# Patient Record
Sex: FEMALE | Race: WHITE | URBAN - METROPOLITAN AREA
[De-identification: names, ages, dates, MRNs, and addresses within clinical notes are randomized per-mention and may not be internally consistent; named-entity substitution may affect disease eponyms.]

---

## 2017-09-21 ENCOUNTER — OUTPATIENT (OUTPATIENT)
Dept: OUTPATIENT SERVICES | Facility: HOSPITAL | Age: 72
LOS: 1 days | Discharge: HOME | End: 2017-09-21

## 2017-09-21 DIAGNOSIS — Z12.31 ENCOUNTER FOR SCREENING MAMMOGRAM FOR MALIGNANT NEOPLASM OF BREAST: ICD-10-CM

## 2018-10-01 ENCOUNTER — APPOINTMENT (OUTPATIENT)
Dept: HEMATOLOGY ONCOLOGY | Facility: CLINIC | Age: 73
End: 2018-10-01

## 2018-10-01 ENCOUNTER — LABORATORY RESULT (OUTPATIENT)
Age: 73
End: 2018-10-01

## 2018-10-01 VITALS
WEIGHT: 160 LBS | SYSTOLIC BLOOD PRESSURE: 185 MMHG | HEART RATE: 68 BPM | HEIGHT: 66 IN | RESPIRATION RATE: 16 BRPM | BODY MASS INDEX: 25.71 KG/M2 | TEMPERATURE: 98.8 F | DIASTOLIC BLOOD PRESSURE: 77 MMHG

## 2018-10-01 DIAGNOSIS — Z86.79 PERSONAL HISTORY OF OTHER DISEASES OF THE CIRCULATORY SYSTEM: ICD-10-CM

## 2018-10-01 DIAGNOSIS — Z80.0 FAMILY HISTORY OF MALIGNANT NEOPLASM OF DIGESTIVE ORGANS: ICD-10-CM

## 2018-10-01 DIAGNOSIS — Z86.39 PERSONAL HISTORY OF OTHER ENDOCRINE, NUTRITIONAL AND METABOLIC DISEASE: ICD-10-CM

## 2018-10-01 DIAGNOSIS — Z86.69 PERSONAL HISTORY OF OTHER DISEASES OF THE NERVOUS SYSTEM AND SENSE ORGANS: ICD-10-CM

## 2018-10-01 RX ORDER — ATENOLOL 50 MG/1
50 TABLET ORAL
Refills: 0 | Status: ACTIVE | COMMUNITY

## 2018-10-01 RX ORDER — DOCUSATE SODIUM 100 MG/1
100 CAPSULE, LIQUID FILLED ORAL
Refills: 0 | Status: ACTIVE | COMMUNITY

## 2018-10-01 RX ORDER — CHROMIUM 200 MCG
1000 TABLET ORAL
Refills: 0 | Status: ACTIVE | COMMUNITY

## 2018-10-01 RX ORDER — PHENOBARBITAL 32.4 MG/1
32.4 TABLET ORAL
Refills: 0 | Status: ACTIVE | COMMUNITY

## 2018-10-05 ENCOUNTER — APPOINTMENT (OUTPATIENT)
Dept: INFUSION THERAPY | Facility: CLINIC | Age: 73
End: 2018-10-05

## 2018-10-05 RX ORDER — IRON SUCROSE 20 MG/ML
200 INJECTION, SOLUTION INTRAVENOUS ONCE
Qty: 0 | Refills: 0 | Status: COMPLETED | OUTPATIENT
Start: 2018-10-05 | End: 2018-10-05

## 2018-10-05 RX ADMIN — IRON SUCROSE 220 MILLIGRAM(S): 20 INJECTION, SOLUTION INTRAVENOUS at 15:29

## 2018-10-12 ENCOUNTER — APPOINTMENT (OUTPATIENT)
Dept: INFUSION THERAPY | Facility: CLINIC | Age: 73
End: 2018-10-12

## 2018-10-12 RX ORDER — IRON SUCROSE 20 MG/ML
200 INJECTION, SOLUTION INTRAVENOUS ONCE
Qty: 0 | Refills: 0 | Status: COMPLETED | OUTPATIENT
Start: 2018-10-12 | End: 2018-10-12

## 2018-10-12 RX ADMIN — IRON SUCROSE 220 MILLIGRAM(S): 20 INJECTION, SOLUTION INTRAVENOUS at 09:28

## 2018-10-19 ENCOUNTER — APPOINTMENT (OUTPATIENT)
Dept: INFUSION THERAPY | Facility: CLINIC | Age: 73
End: 2018-10-19

## 2018-10-19 VITALS
DIASTOLIC BLOOD PRESSURE: 62 MMHG | TEMPERATURE: 98.2 F | HEART RATE: 68 BPM | RESPIRATION RATE: 18 BRPM | SYSTOLIC BLOOD PRESSURE: 143 MMHG

## 2018-10-19 RX ORDER — IRON SUCROSE 20 MG/ML
200 INJECTION, SOLUTION INTRAVENOUS ONCE
Qty: 0 | Refills: 0 | Status: COMPLETED | OUTPATIENT
Start: 2018-10-19 | End: 2018-10-19

## 2018-10-19 RX ADMIN — IRON SUCROSE 220 MILLIGRAM(S): 20 INJECTION, SOLUTION INTRAVENOUS at 09:22

## 2018-10-26 ENCOUNTER — APPOINTMENT (OUTPATIENT)
Dept: INFUSION THERAPY | Facility: CLINIC | Age: 73
End: 2018-10-26

## 2018-10-26 RX ORDER — IRON SUCROSE 20 MG/ML
200 INJECTION, SOLUTION INTRAVENOUS ONCE
Qty: 0 | Refills: 0 | Status: COMPLETED | OUTPATIENT
Start: 2018-10-26 | End: 2018-10-26

## 2018-10-26 RX ADMIN — IRON SUCROSE 220 MILLIGRAM(S): 20 INJECTION, SOLUTION INTRAVENOUS at 09:26

## 2018-11-02 ENCOUNTER — APPOINTMENT (OUTPATIENT)
Dept: INFUSION THERAPY | Facility: CLINIC | Age: 73
End: 2018-11-02

## 2018-11-02 RX ORDER — IRON SUCROSE 20 MG/ML
200 INJECTION, SOLUTION INTRAVENOUS ONCE
Qty: 0 | Refills: 0 | Status: COMPLETED | OUTPATIENT
Start: 2018-11-02 | End: 2018-11-02

## 2018-11-02 RX ADMIN — IRON SUCROSE 220 MILLIGRAM(S): 20 INJECTION, SOLUTION INTRAVENOUS at 09:53

## 2018-11-12 ENCOUNTER — APPOINTMENT (OUTPATIENT)
Dept: HEMATOLOGY ONCOLOGY | Facility: CLINIC | Age: 73
End: 2018-11-12

## 2018-11-12 ENCOUNTER — LABORATORY RESULT (OUTPATIENT)
Age: 73
End: 2018-11-12

## 2018-12-21 ENCOUNTER — APPOINTMENT (OUTPATIENT)
Dept: HEMATOLOGY ONCOLOGY | Facility: CLINIC | Age: 73
End: 2018-12-21

## 2018-12-21 ENCOUNTER — OUTPATIENT (OUTPATIENT)
Dept: OUTPATIENT SERVICES | Facility: HOSPITAL | Age: 73
LOS: 1 days | Discharge: HOME | End: 2018-12-21

## 2018-12-21 ENCOUNTER — OTHER (OUTPATIENT)
Age: 73
End: 2018-12-21

## 2018-12-21 ENCOUNTER — LABORATORY RESULT (OUTPATIENT)
Age: 73
End: 2018-12-21

## 2018-12-21 VITALS
TEMPERATURE: 98.2 F | WEIGHT: 164 LBS | DIASTOLIC BLOOD PRESSURE: 74 MMHG | SYSTOLIC BLOOD PRESSURE: 142 MMHG | HEIGHT: 66 IN | RESPIRATION RATE: 16 BRPM | BODY MASS INDEX: 26.36 KG/M2 | HEART RATE: 62 BPM

## 2018-12-21 DIAGNOSIS — Z12.31 ENCOUNTER FOR SCREENING MAMMOGRAM FOR MALIGNANT NEOPLASM OF BREAST: ICD-10-CM

## 2018-12-21 DIAGNOSIS — D50.9 IRON DEFICIENCY ANEMIA, UNSPECIFIED: ICD-10-CM

## 2018-12-21 LAB
ENDOMYSIUM IGA SER QL: NEGATIVE
ENDOMYSIUM IGA TITR SER: NORMAL
FERRITIN SERPL-MCNC: 452 NG/ML
FERRITIN SERPL-MCNC: 9 NG/ML
FOLATE SERPL-MCNC: 14.9 NG/ML
GLIADIN IGA SER QL: 7.4 UNITS
GLIADIN IGG SER QL: <5 UNITS
GLIADIN PEPTIDE IGA SER-ACNC: NEGATIVE
GLIADIN PEPTIDE IGG SER-ACNC: NEGATIVE
HCT VFR BLD CALC: 37.5 %
HCT VFR BLD CALC: 39.3 %
HCT VFR BLD CALC: 41.6 %
HGB BLD-MCNC: 11.5 G/DL
HGB BLD-MCNC: 12.3 G/DL
HGB BLD-MCNC: 13.7 G/DL
IRON SATN MFR SERPL: 10 %
IRON SATN MFR SERPL: 51 %
IRON SERPL-MCNC: 132 UG/DL
IRON SERPL-MCNC: 38 UG/DL
MCHC RBC-ENTMCNC: 26.3 PG
MCHC RBC-ENTMCNC: 28.4 PG
MCHC RBC-ENTMCNC: 30.1 PG
MCHC RBC-ENTMCNC: 30.7 G/DL
MCHC RBC-ENTMCNC: 31.3 G/DL
MCHC RBC-ENTMCNC: 32.9 G/DL
MCV RBC AUTO: 85.6 FL
MCV RBC AUTO: 90.8 FL
MCV RBC AUTO: 91.4 FL
METHYLMALONATE SERPL-SCNC: 224 NMOL/L
PLATELET # BLD AUTO: 146 K/UL
PLATELET # BLD AUTO: 161 K/UL
PLATELET # BLD AUTO: 195 K/UL
PMV BLD: 10.4 FL
PMV BLD: 10.9 FL
PMV BLD: 11 FL
RBC # BLD: 4.33 M/UL
RBC # BLD: 4.38 M/UL
RBC # BLD: 4.55 M/UL
RBC # FLD: 14.1 %
RBC # FLD: 15.9 %
RBC # FLD: 18.1 %
RETICS # AUTO: 1 %
RETICS AGGREG/RBC NFR: 40.3 K/UL
TIBC SERPL-MCNC: 261 UG/DL
TIBC SERPL-MCNC: 385 UG/DL
TTG IGA SER IA-ACNC: 8.1 UNITS
TTG IGA SER-ACNC: NEGATIVE
TTG IGG SER IA-ACNC: <5 UNITS
TTG IGG SER IA-ACNC: NEGATIVE
UIBC SERPL-MCNC: 129 UG/DL
UIBC SERPL-MCNC: 347 UG/DL
VIT B12 SERPL-MCNC: 460 PG/ML
WBC # FLD AUTO: 3.44 K/UL
WBC # FLD AUTO: 4.24 K/UL
WBC # FLD AUTO: 5.3 K/UL

## 2018-12-24 DIAGNOSIS — Z02.9 ENCOUNTER FOR ADMINISTRATIVE EXAMINATIONS, UNSPECIFIED: ICD-10-CM

## 2018-12-24 DIAGNOSIS — Z12.31 ENCOUNTER FOR SCREENING MAMMOGRAM FOR MALIGNANT NEOPLASM OF BREAST: ICD-10-CM

## 2019-02-05 ENCOUNTER — APPOINTMENT (OUTPATIENT)
Dept: HEMATOLOGY ONCOLOGY | Facility: CLINIC | Age: 74
End: 2019-02-05

## 2019-02-05 ENCOUNTER — LABORATORY RESULT (OUTPATIENT)
Age: 74
End: 2019-02-05

## 2019-03-22 ENCOUNTER — APPOINTMENT (OUTPATIENT)
Dept: HEMATOLOGY ONCOLOGY | Facility: CLINIC | Age: 74
End: 2019-03-22

## 2019-03-22 ENCOUNTER — OUTPATIENT (OUTPATIENT)
Dept: OUTPATIENT SERVICES | Facility: HOSPITAL | Age: 74
LOS: 1 days | Discharge: HOME | End: 2019-03-22

## 2019-03-22 ENCOUNTER — LABORATORY RESULT (OUTPATIENT)
Age: 74
End: 2019-03-22

## 2019-03-22 VITALS
TEMPERATURE: 98.2 F | DIASTOLIC BLOOD PRESSURE: 63 MMHG | SYSTOLIC BLOOD PRESSURE: 181 MMHG | BODY MASS INDEX: 26.52 KG/M2 | WEIGHT: 165 LBS | HEART RATE: 67 BPM | HEIGHT: 66 IN | RESPIRATION RATE: 16 BRPM

## 2019-03-22 DIAGNOSIS — D50.9 IRON DEFICIENCY ANEMIA, UNSPECIFIED: ICD-10-CM

## 2019-03-22 LAB
FERRITIN SERPL-MCNC: 150 NG/ML
HCT VFR BLD CALC: 40.9 %
HCT VFR BLD CALC: 41.5 %
HGB BLD-MCNC: 13.5 G/DL
HGB BLD-MCNC: 13.8 G/DL
IRON SATN MFR SERPL: 42 %
IRON SERPL-MCNC: 118 UG/DL
MCHC RBC-ENTMCNC: 31.1 PG
MCHC RBC-ENTMCNC: 32.3 PG
MCHC RBC-ENTMCNC: 32.5 G/DL
MCHC RBC-ENTMCNC: 33.7 G/DL
MCV RBC AUTO: 95.6 FL
MCV RBC AUTO: 95.8 FL
PLATELET # BLD AUTO: 138 K/UL
PLATELET # BLD AUTO: 139 K/UL
PMV BLD: 10.9 FL
PMV BLD: 11.1 FL
RBC # BLD: 4.27 M/UL
RBC # BLD: 4.34 M/UL
RBC # FLD: 12.1 %
RBC # FLD: 12.7 %
TIBC SERPL-MCNC: 281 UG/DL
UIBC SERPL-MCNC: 163 UG/DL
WBC # FLD AUTO: 3.81 K/UL
WBC # FLD AUTO: 3.84 K/UL

## 2019-03-25 LAB
FERRITIN SERPL-MCNC: 150 NG/ML
IRON SATN MFR SERPL: 39 %
IRON SERPL-MCNC: 99 UG/DL
TIBC SERPL-MCNC: 255 UG/DL
UIBC SERPL-MCNC: 156 UG/DL

## 2019-03-27 NOTE — REASON FOR VISIT
[Follow-Up Visit] : a follow-up visit for [Spouse] : spouse [FreeTextEntry2] : Iron deficiency Anemia

## 2019-03-27 NOTE — REVIEW OF SYSTEMS
[Constipation] : constipation [Anxiety] : anxiety [Negative] : Allergic/Immunologic [Suicidal] : not suicidal [Insomnia] : no insomnia [Depression] : no depression [FreeTextEntry7] : chronic constipation- taking stool softener with laxative

## 2019-03-27 NOTE — ASSESSMENT
[FreeTextEntry1] : Iron Deficiency Anemia\par \par PLAN:\par --S/P Venofer 200 mg iv x 5 from Oct - Nov 2018. Repeat ferritin - 150 and percent sat 42% (2/2019)\par --Celiac disease serology negative. B12 and FA wnl. She was on rabeprazole for several years which can result in atrophic gastritis. Also admits to have had poor diet deficient in meat or green leafy veg for 2 years \par -following her mother's death, nutritional component may also have been present. Diet now is well balanced per pt and spouse.\par -- S/P EGD and colonoscopy, normal per pt. Requested to bring in the report. \par -- Will order blood work ordered for CBC, iron studies, TIBC, % sat, Ferritin\par -- RTO for followup in 3 months.\par

## 2019-03-27 NOTE — HISTORY OF PRESENT ILLNESS
[de-identified] : 73 year old white female here referred by Dr. Pérez for iron deficiency anemia.  She had blood work on 926/18. CBC showed Hb 10.8, MCV 84.4, WBC 4.6, . Serum iron 28, % sat 7%, Ferritin 8, B12 335. Her renal function is normal. She admits to change in appetite since death of mother 2 years ago. She does not eat meat. She denies any sign of bleeding, n/v/d. She feels little anxious and dizziness. \par She had colonoscopy on 9/4/18 by Dr. Salazar. She was told that there was no abnormal finding. \par Endoscopy is scheduled to be done on 10/4/18.\par She is taking Aciphex x 15 years. She is concerning if this would cause iron deficiency.\par \par She has h/o seizure and has been taking phenobarbital. She had hysterectomy at 41 yo for menorrhagia.\par \par She does screening mammogram annually.\par  [de-identified] : 3/22/19: \par Pt returns for a follow-up visit. Due to iron deficiency, she received 5 doses of IV venofer 200 mg x 5 doses in Oct-Nov 2018. Her ferritin was 150 in Feb 2019 and percent sat 42%. Today's Hb is 13.8. No fresh complaints. No bleeding. S/P EGD. She was told by Dr Salazar that it was wnl. Capsule endoscopy was not done, given her stable Hb and iron stores.

## 2019-06-03 ENCOUNTER — LABORATORY RESULT (OUTPATIENT)
Age: 74
End: 2019-06-03

## 2019-06-03 ENCOUNTER — APPOINTMENT (OUTPATIENT)
Dept: HEMATOLOGY ONCOLOGY | Facility: CLINIC | Age: 74
End: 2019-06-03
Payer: MEDICARE

## 2019-06-03 PROCEDURE — 99213 OFFICE O/P EST LOW 20 MIN: CPT

## 2019-06-03 NOTE — HISTORY OF PRESENT ILLNESS
[de-identified] : 73 year old white female was referred by Dr. Pérez for iron deficiency anemia.  She had blood work on 926/18. CBC showed Hb 10.8, MCV 84.4, WBC 4.6, . Serum iron 28, % sat 7%, Ferritin 8, B12 335. Her renal function is normal. She admits to change in appetite since death of mother 2 years ago. She does not eat meat. She denies any sign of bleeding, n/v/d. She feels little anxious and dizziness. \par She had colonoscopy on 9/4/18 by Dr. Salazar. She was told that there was no abnormal finding. \par Endoscopy is scheduled to be done on 10/4/18.\par She is taking Aciphex x 15 years. She is concerning if this would cause iron deficiency.\par \par She has h/o seizure and has been taking phenobarbital. She had hysterectomy at 41 yo for menorrhagia.\par \par She does screening mammogram annually.\par  [de-identified] : 12/21/18:\par The patient is here for f/u visit. She received venofer 200 mg weekly x 5

## 2019-06-03 NOTE — REVIEW OF SYSTEMS
[Constipation] : constipation [Negative] : Allergic/Immunologic [FreeTextEntry7] : chronic constipation- taking stool softener with laxative

## 2019-06-03 NOTE — ASSESSMENT
[FreeTextEntry1] : Iron Deficiency Anemia\par \par PLAN:\par -- Given she can not tolerate oral iron supplement due to constipation, will schedule her for Venofer 200 mg iv x 5.\par -- Followup with GI for endoscopy as scheduled on 10/4/18. Patient may need a capsule endoscopy.\par -- Will order blood work ordered for CBC, iron studies, TIBC, % sat, Ferritin. B12, MMA, Celiac disease panel.\par -- RTO for followup in 2 months.\par

## 2019-06-03 NOTE — REASON FOR VISIT
[Initial Consultation] : an initial consultation for [Spouse] : spouse [FreeTextEntry2] : Iron deficiency anemia

## 2019-06-03 NOTE — CONSULT LETTER
[Dear  ___] : Dear  [unfilled], [Consult Letter:] : I had the pleasure of evaluating your patient, [unfilled]. [Please see my note below.] : Please see my note below. [Consult Closing:] : Thank you very much for allowing me to participate in the care of this patient.  If you have any questions, please do not hesitate to contact me. [Sincerely,] : Sincerely, [FreeTextEntry3] : Martin Hernadnez MD

## 2019-06-05 NOTE — ASSESSMENT
[FreeTextEntry1] : Iron Deficiency Anemia.\par Chronic leukopenia.\par \par PLAN:\par -- Reviewed repeat CBC from today. It shows normal Hb and stable mild leukopenia.\par -- Will order blood work for iron studies, TIBC, % sat, Ferritin.\par -- Continue observation.\par -- RTO for followup in 3 months.\par

## 2019-06-05 NOTE — HISTORY OF PRESENT ILLNESS
[de-identified] : 73 year old white female here referred by Dr. Pérez for iron deficiency anemia.  She had blood work on 926/18. CBC showed Hb 10.8, MCV 84.4, WBC 4.6, . Serum iron 28, % sat 7%, Ferritin 8, B12 335. Her renal function is normal. She admits to change in appetite since death of mother 2 years ago. She does not eat meat. She denies any sign of bleeding, n/v/d. She feels little anxious and dizziness. \par She had colonoscopy on 9/4/18 by Dr. Salazar. She was told that there was no abnormal finding. \par Endoscopy is scheduled to be done on 10/4/18.\par She is taking Aciphex x 15 years. She is concerning if this would cause iron deficiency.\par \par She has h/o seizure and has been taking phenobarbital. She had hysterectomy at 39 yo for menorrhagia.\par \par She does screening mammogram annually.\par  [de-identified] : 3/22/19: \par Pt returns for a follow-up visit. Due to iron deficiency, she received 5 doses of IV venofer 200 mg x 5 doses in Oct-Nov 2018. Her ferritin was 150 in Feb 2019 and percent sat 42%. Today's Hb is 13.8. No fresh complaints. No bleeding. S/P EGD. She was told by Dr Salazar that it was wnl. Capsule endoscopy was not done, given her stable Hb and iron stores. \par \par 6/3/19:\par The patient is here for followup visit. She has a h/o iron deficiency anemia and received  IV venofer 200 mg x 5 doses in Oct-Nov 2018. She responded well. Her repeat CBC showed normal Hb. Celiac disease serology was negative. B12 and FA wnl. She does not have new complains.

## 2019-06-05 NOTE — REVIEW OF SYSTEMS
[Constipation] : constipation [Anxiety] : anxiety [Negative] : Heme/Lymph [Suicidal] : not suicidal [Insomnia] : no insomnia [Depression] : no depression [FreeTextEntry7] : chronic constipation- taking stool softener with laxative

## 2019-09-09 ENCOUNTER — OUTPATIENT (OUTPATIENT)
Dept: OUTPATIENT SERVICES | Facility: HOSPITAL | Age: 74
LOS: 1 days | Discharge: HOME | End: 2019-09-09

## 2019-09-09 ENCOUNTER — APPOINTMENT (OUTPATIENT)
Dept: HEMATOLOGY ONCOLOGY | Facility: CLINIC | Age: 74
End: 2019-09-09
Payer: MEDICARE

## 2019-09-09 ENCOUNTER — LABORATORY RESULT (OUTPATIENT)
Age: 74
End: 2019-09-09

## 2019-09-09 VITALS
BODY MASS INDEX: 26.84 KG/M2 | SYSTOLIC BLOOD PRESSURE: 186 MMHG | RESPIRATION RATE: 16 BRPM | TEMPERATURE: 97.7 F | HEIGHT: 66 IN | WEIGHT: 167 LBS | DIASTOLIC BLOOD PRESSURE: 73 MMHG | HEART RATE: 66 BPM

## 2019-09-09 DIAGNOSIS — D72.819 DECREASED WHITE BLOOD CELL COUNT, UNSPECIFIED: ICD-10-CM

## 2019-09-09 DIAGNOSIS — D50.9 IRON DEFICIENCY ANEMIA, UNSPECIFIED: ICD-10-CM

## 2019-09-09 DIAGNOSIS — Z00.00 ENCOUNTER FOR GENERAL ADULT MEDICAL EXAMINATION W/OUT ABNORMAL FINDINGS: ICD-10-CM

## 2019-09-09 PROCEDURE — 99213 OFFICE O/P EST LOW 20 MIN: CPT

## 2019-09-09 RX ORDER — RABEPRAZOLE SODIUM 20 MG/1
20 TABLET, DELAYED RELEASE ORAL
Refills: 0 | Status: COMPLETED | COMMUNITY
End: 2019-09-09

## 2019-09-17 NOTE — ASSESSMENT
[FreeTextEntry1] : Iron Deficiency Anemia.\par Chronic leukopenia.\par \par PLAN:\par -- Reviewed repeat CBC from today. It shows normal Hb and stable mild leukopenia.\par -- Will order blood work for iron studies, TIBC, % sat, Ferritin.\par -- Continue observation.\par -- RTO for followup in 6 months.\par \par Case was seen and discussed with Dr. Hernandez who agreed with the assessment and plan.\par \par

## 2019-09-17 NOTE — HISTORY OF PRESENT ILLNESS
[de-identified] : 73 year old white female here referred by Dr. Pérez for iron deficiency anemia.  She had blood work on 926/18. CBC showed Hb 10.8, MCV 84.4, WBC 4.6, . Serum iron 28, % sat 7%, Ferritin 8, B12 335. Her renal function is normal. She admits to change in appetite since death of mother 2 years ago. She does not eat meat. She denies any sign of bleeding, n/v/d. She feels little anxious and dizziness. \par She had colonoscopy on 9/4/18 by Dr. Salazar. She was told that there was no abnormal finding. \par Endoscopy is scheduled to be done on 10/4/18.\par She is taking Aciphex x 15 years. She is concerning if this would cause iron deficiency.\par \par She has h/o seizure and has been taking phenobarbital. She had hysterectomy at 41 yo for menorrhagia.\par \par She does screening mammogram annually.\par  [de-identified] : 3/22/19: \par Pt returns for a follow-up visit. Due to iron deficiency, she received 5 doses of IV venofer 200 mg x 5 doses in Oct-Nov 2018. Her ferritin was 150 in Feb 2019 and percent sat 42%. Today's Hb is 13.8. No fresh complaints. No bleeding. S/P EGD. She was told by Dr Salazar that it was wnl. Capsule endoscopy was not done, given her stable Hb and iron stores. \par \par 6/3/19:\par The patient is here for followup visit. She has a h/o iron deficiency anemia and received  IV venofer 200 mg x 5 doses in Oct-Nov 2018. She responded well. Her repeat CBC showed normal Hb. Celiac disease serology was negative. B12 and FA wnl. She does not have new complaints.\par \par 9/9/19\par The patient is here for followup visit. She has a h/o iron deficiency anemia and received  IV Venofer 200 mg x 5 doses in Oct-Nov 2018. She responded well. Her repeat CBC showed normal Hgb=14.2, Kaybqymy=498. She is eating roast beef sandwiches daily. Celiac disease serology was negative. B12 and FA wnl. \par Her sister passed away last month from colon cancer. \par She c/o toothache but has appt with dentist tomorrow.\par

## 2019-12-23 ENCOUNTER — OUTPATIENT (OUTPATIENT)
Dept: OUTPATIENT SERVICES | Facility: HOSPITAL | Age: 74
LOS: 1 days | Discharge: HOME | End: 2019-12-23
Payer: MEDICARE

## 2019-12-23 DIAGNOSIS — Z12.31 ENCOUNTER FOR SCREENING MAMMOGRAM FOR MALIGNANT NEOPLASM OF BREAST: ICD-10-CM

## 2019-12-23 PROCEDURE — 77067 SCR MAMMO BI INCL CAD: CPT | Mod: 26

## 2019-12-23 PROCEDURE — 77063 BREAST TOMOSYNTHESIS BI: CPT | Mod: 26

## 2020-03-09 ENCOUNTER — APPOINTMENT (OUTPATIENT)
Dept: HEMATOLOGY ONCOLOGY | Facility: CLINIC | Age: 75
End: 2020-03-09
Payer: MEDICARE

## 2020-03-09 ENCOUNTER — LABORATORY RESULT (OUTPATIENT)
Age: 75
End: 2020-03-09

## 2020-03-09 VITALS
BODY MASS INDEX: 27 KG/M2 | RESPIRATION RATE: 16 BRPM | DIASTOLIC BLOOD PRESSURE: 84 MMHG | HEART RATE: 71 BPM | HEIGHT: 66 IN | TEMPERATURE: 97.8 F | SYSTOLIC BLOOD PRESSURE: 184 MMHG | WEIGHT: 168 LBS

## 2020-03-09 PROCEDURE — 99213 OFFICE O/P EST LOW 20 MIN: CPT

## 2020-03-09 NOTE — HISTORY OF PRESENT ILLNESS
[de-identified] : 73 year old white female here referred by Dr. Pérez for iron deficiency anemia.  She had blood work on 926/18. CBC showed Hb 10.8, MCV 84.4, WBC 4.6, . Serum iron 28, % sat 7%, Ferritin 8, B12 335. Her renal function is normal. She admits to change in appetite since death of mother 2 years ago. She does not eat meat. She denies any sign of bleeding, n/v/d. She feels little anxious and dizziness. \par She had colonoscopy on 9/4/18 by Dr. Salazar. She was told that there was no abnormal finding. \par Endoscopy is scheduled to be done on 10/4/18.\par She is taking Aciphex x 15 years. She is concerning if this would cause iron deficiency.\par \par She has h/o seizure and has been taking phenobarbital. She had hysterectomy at 39 yo for menorrhagia.\par \par She does screening mammogram annually.\par  [de-identified] : 3/22/19: \par Pt returns for a follow-up visit. Due to iron deficiency, she received 5 doses of IV venofer 200 mg x 5 doses in Oct-Nov 2018. Her ferritin was 150 in Feb 2019 and percent sat 42%. Today's Hb is 13.8. No fresh complaints. No bleeding. S/P EGD. She was told by Dr Salazar that it was wnl. Capsule endoscopy was not done, given her stable Hb and iron stores. \par \par 6/3/19:\par The patient is here for followup visit. She has a h/o iron deficiency anemia and received  IV Venofer 200 mg x 5 doses in Oct-Nov 2018. She responded well. Her repeat CBC showed normal Hb. Celiac disease serology was negative. B12 and FA wnl. She does not have new complaints.\par \par 9/9/19\par The patient is here for followup visit. She has a h/o iron deficiency anemia and received  IV Venofer 200 mg x 5 doses in Oct-Nov 2018. She responded well. Her repeat CBC showed normal Hgb=14.2, Vwujhmey=953. She is eating roast beef sandwiches daily. Celiac disease serology was negative. B12 and FA wnl. \par Her sister passed away last month from colon cancer. \par She c/o toothache but has appt with dentist tomorrow.\par \par 3/9/2020\par Patient is here today for follow up visit for h/o iron deficiency anemia.  She last received Venofer 200 mg IV x 5 doses in Oct-Nov 2018 which she responded well.  She feels tired but also busy doing many things...gardening, crafts, baking.\par CBC today shows wbc=4.46, Hgb=13.5, yjp=347. In the past, celiac disease serology was negative. B12 and FA wnl. \par She has appt with PCP, Dr. Pérez today for follow up care and refill of Phenobarbital for h/o grand mal seizures.

## 2020-03-09 NOTE — ASSESSMENT
[FreeTextEntry1] : Iron Deficiency Anemia.\par Chronic leukopenia.\par \par PLAN:\par -- Reviewed repeat CBC from today. It shows normal Hb and stable mild leukopenia.\par -- Will order blood work for iron studies, TIBC, % sat, Ferritin. We  will pt tomorrow for results.\par -- Continue observation.\par -- RTO for followup in 6 months.\par \par Case was seen and discussed with Dr. Hernandez who agreed with the assessment and plan.\par \par

## 2020-03-29 LAB
FERRITIN SERPL-MCNC: 104 NG/ML
FERRITIN SERPL-MCNC: 112 NG/ML
FERRITIN SERPL-MCNC: 116 NG/ML
HCT VFR BLD CALC: 41.5 %
HCT VFR BLD CALC: 41.6 %
HCT VFR BLD CALC: 42.4 %
HGB BLD-MCNC: 13.5 G/DL
HGB BLD-MCNC: 13.8 G/DL
HGB BLD-MCNC: 14.2 G/DL
IRON SATN MFR SERPL: 40 %
IRON SATN MFR SERPL: 43 %
IRON SATN MFR SERPL: 44 %
IRON SERPL-MCNC: 106 UG/DL
IRON SERPL-MCNC: 120 UG/DL
IRON SERPL-MCNC: 123 UG/DL
MCHC RBC-ENTMCNC: 31.7 PG
MCHC RBC-ENTMCNC: 32.1 PG
MCHC RBC-ENTMCNC: 32.3 PG
MCHC RBC-ENTMCNC: 32.5 G/DL
MCHC RBC-ENTMCNC: 33.2 G/DL
MCHC RBC-ENTMCNC: 33.5 G/DL
MCV RBC AUTO: 96.4 FL
MCV RBC AUTO: 96.7 FL
MCV RBC AUTO: 97.4 FL
PLATELET # BLD AUTO: 145 K/UL
PLATELET # BLD AUTO: 148 K/UL
PLATELET # BLD AUTO: 159 K/UL
PMV BLD: 10.6 FL
PMV BLD: 10.9 FL
PMV BLD: 10.9 FL
RBC # BLD: 4.26 M/UL
RBC # BLD: 4.3 M/UL
RBC # BLD: 4.4 M/UL
RBC # FLD: 11.8 %
RBC # FLD: 11.9 %
RBC # FLD: 12.2 %
TIBC SERPL-MCNC: 266 UG/DL
TIBC SERPL-MCNC: 280 UG/DL
TIBC SERPL-MCNC: 282 UG/DL
UIBC SERPL-MCNC: 157 UG/DL
UIBC SERPL-MCNC: 160 UG/DL
UIBC SERPL-MCNC: 162 UG/DL
WBC # FLD AUTO: 4.37 K/UL
WBC # FLD AUTO: 4.46 K/UL
WBC # FLD AUTO: 4.52 K/UL

## 2020-09-14 ENCOUNTER — OUTPATIENT (OUTPATIENT)
Dept: OUTPATIENT SERVICES | Facility: HOSPITAL | Age: 75
LOS: 1 days | Discharge: HOME | End: 2020-09-14
Payer: MEDICARE

## 2020-09-14 ENCOUNTER — RESULT REVIEW (OUTPATIENT)
Age: 75
End: 2020-09-14

## 2020-09-14 ENCOUNTER — OUTPATIENT (OUTPATIENT)
Dept: OUTPATIENT SERVICES | Facility: HOSPITAL | Age: 75
LOS: 1 days | Discharge: HOME | End: 2020-09-14

## 2020-09-14 ENCOUNTER — APPOINTMENT (OUTPATIENT)
Dept: HEMATOLOGY ONCOLOGY | Facility: CLINIC | Age: 75
End: 2020-09-14
Payer: MEDICARE

## 2020-09-14 ENCOUNTER — LABORATORY RESULT (OUTPATIENT)
Age: 75
End: 2020-09-14

## 2020-09-14 VITALS
HEIGHT: 66 IN | BODY MASS INDEX: 26.2 KG/M2 | TEMPERATURE: 98.6 F | WEIGHT: 163 LBS | HEART RATE: 60 BPM | DIASTOLIC BLOOD PRESSURE: 69 MMHG | SYSTOLIC BLOOD PRESSURE: 185 MMHG

## 2020-09-14 DIAGNOSIS — M79.89 OTHER SPECIFIED SOFT TISSUE DISORDERS: ICD-10-CM

## 2020-09-14 DIAGNOSIS — D72.819 DECREASED WHITE BLOOD CELL COUNT, UNSPECIFIED: ICD-10-CM

## 2020-09-14 DIAGNOSIS — D50.9 IRON DEFICIENCY ANEMIA, UNSPECIFIED: ICD-10-CM

## 2020-09-14 PROCEDURE — 99213 OFFICE O/P EST LOW 20 MIN: CPT

## 2020-09-14 PROCEDURE — 73130 X-RAY EXAM OF HAND: CPT | Mod: 26,LT

## 2020-09-16 DIAGNOSIS — Z02.9 ENCOUNTER FOR ADMINISTRATIVE EXAMINATIONS, UNSPECIFIED: ICD-10-CM

## 2020-09-16 DIAGNOSIS — M79.89 OTHER SPECIFIED SOFT TISSUE DISORDERS: ICD-10-CM

## 2020-09-23 LAB
ANA SER IF-ACNC: NEGATIVE
FERRITIN SERPL-MCNC: 82 NG/ML
HCT VFR BLD CALC: 43 %
HGB BLD-MCNC: 13.9 G/DL
IRON SATN MFR SERPL: 35 %
IRON SERPL-MCNC: 100 UG/DL
MCHC RBC-ENTMCNC: 31.5 PG
MCHC RBC-ENTMCNC: 32.3 G/DL
MCV RBC AUTO: 97.5 FL
PLATELET # BLD AUTO: 153 K/UL
PMV BLD: 10.9 FL
RBC # BLD: 4.41 M/UL
RBC # FLD: 12.1 %
RHEUMATOID FACT SER QL: <10 IU/ML
TIBC SERPL-MCNC: 283 UG/DL
UIBC SERPL-MCNC: 183 UG/DL
WBC # FLD AUTO: 4.53 K/UL

## 2020-09-23 NOTE — PHYSICAL EXAM
[Fully active, able to carry on all pre-disease performance without restriction] : Status 0 - Fully active, able to carry on all pre-disease performance without restriction [Normal] : affect appropriate [de-identified] : irregular [de-identified] : arthritic changes to left hand

## 2020-09-23 NOTE — ASSESSMENT
[FreeTextEntry1] : Iron Deficiency Anemia.\par Chronic leukopenia.\par \par PLAN:\par -- Reviewed repeat CBC from today. It shows normal Hb and stable mild leukopenia.\par -- Will order blood work for iron studies, TIBC, % sat, Ferritin. We will pt tomorrow for results.\par -- We will order MAIRA and RA factor to r/o rheumatoid arthritis: unlikely since changes only noted on one hand.\par -- X- ray ordered to evaluate left hand swelling and tenderness\par -- Continue observation.\par -- RTO for followup in 6 months.\par \par Case was seen and discussed with Dr. Hernandez who agreed with the assessment and plan.\par \par

## 2020-09-23 NOTE — HISTORY OF PRESENT ILLNESS
[de-identified] : 73 year old white female here referred by Dr. Pérez for iron deficiency anemia.  She had blood work on 926/18. CBC showed Hb 10.8, MCV 84.4, WBC 4.6, . Serum iron 28, % sat 7%, Ferritin 8, B12 335. Her renal function is normal. She admits to change in appetite since death of mother 2 years ago. She does not eat meat. She denies any sign of bleeding, n/v/d. She feels little anxious and dizziness. \par She had colonoscopy on 9/4/18 by Dr. Salazar. She was told that there was no abnormal finding. \par Endoscopy is scheduled to be done on 10/4/18.\par She is taking Aciphex x 15 years. She is concerning if this would cause iron deficiency.\par \par She has h/o seizure and has been taking phenobarbital. She had hysterectomy at 41 yo for menorrhagia.\par \par She does screening mammogram annually.\par  [de-identified] : 3/22/19: \par Pt returns for a follow-up visit. Due to iron deficiency, she received 5 doses of IV venofer 200 mg x 5 doses in Oct-Nov 2018. Her ferritin was 150 in Feb 2019 and percent sat 42%. Today's Hb is 13.8. No fresh complaints. No bleeding. S/P EGD. She was told by Dr Salazar that it was wnl. Capsule endoscopy was not done, given her stable Hb and iron stores. \par \par 6/3/19:\par The patient is here for followup visit. She has a h/o iron deficiency anemia and received  IV Venofer 200 mg x 5 doses in Oct-Nov 2018. She responded well. Her repeat CBC showed normal Hb. Celiac disease serology was negative. B12 and FA wnl. She does not have new complaints.\par \par 9/9/19\par The patient is here for followup visit. She has a h/o iron deficiency anemia and received  IV Venofer 200 mg x 5 doses in Oct-Nov 2018. She responded well. Her repeat CBC showed normal Hgb=14.2, Unyjnizn=009. She is eating roast beef sandwiches daily. Celiac disease serology was negative. B12 and FA wnl. \par Her sister passed away last month from colon cancer. \par She c/o toothache but has appt with dentist tomorrow.\par \par 3/9/2020\par Patient is here today for follow up visit for h/o iron deficiency anemia.  She last received Venofer 200 mg IV x 5 doses in Oct-Nov 2018 which she responded well.  She feels tired but also busy doing many things...gardening, crafts, baking.\par CBC today shows wbc=4.46, Hgb=13.5, uwl=866. In the past, celiac disease serology was negative. B12 and FA wnl. \par She has appt with PCP, Dr. Pérez today for follow up care and refill of Phenobarbital for h/o grand mal seizures. \par \par 9/14/2020: DELBERT HASSAN is a 75 year old female here today for follow up visit for history of GINA. She last received Venofer 200 mg IV x 5 doses in Oct-Nov 2018 with favorable response. She states that she has increased fatigue in the afternoon. She denies any hematochezia, SOB, or palpitations. She attributes the increase fatigue secondary to taking phenobarbital for grand mal seizures. Celiac disease serology was negative. B12 and FA were normal. In addition, she has noticed some swelling and tenderness to the left hand and index finger.

## 2020-09-23 NOTE — REVIEW OF SYSTEMS
[Negative] : Endocrine [Joint Stiffness] : joint stiffness [Joint Pain] : joint pain [Suicidal] : not suicidal [Insomnia] : no insomnia [Depression] : no depression [FreeTextEntry9] : left hand arthritic pain

## 2021-03-22 ENCOUNTER — OUTPATIENT (OUTPATIENT)
Dept: OUTPATIENT SERVICES | Facility: HOSPITAL | Age: 76
LOS: 1 days | Discharge: HOME | End: 2021-03-22

## 2021-03-22 ENCOUNTER — APPOINTMENT (OUTPATIENT)
Dept: HEMATOLOGY ONCOLOGY | Facility: CLINIC | Age: 76
End: 2021-03-22
Payer: MEDICARE

## 2021-03-22 ENCOUNTER — LABORATORY RESULT (OUTPATIENT)
Age: 76
End: 2021-03-22

## 2021-03-22 ENCOUNTER — OUTPATIENT (OUTPATIENT)
Dept: OUTPATIENT SERVICES | Facility: HOSPITAL | Age: 76
LOS: 1 days | Discharge: HOME | End: 2021-03-22
Payer: MEDICARE

## 2021-03-22 VITALS
BODY MASS INDEX: 26.84 KG/M2 | DIASTOLIC BLOOD PRESSURE: 74 MMHG | HEIGHT: 66 IN | SYSTOLIC BLOOD PRESSURE: 178 MMHG | TEMPERATURE: 97.6 F | HEART RATE: 63 BPM | WEIGHT: 167 LBS

## 2021-03-22 DIAGNOSIS — Z12.31 ENCOUNTER FOR SCREENING MAMMOGRAM FOR MALIGNANT NEOPLASM OF BREAST: ICD-10-CM

## 2021-03-22 PROCEDURE — 77067 SCR MAMMO BI INCL CAD: CPT | Mod: 26

## 2021-03-22 PROCEDURE — 99213 OFFICE O/P EST LOW 20 MIN: CPT

## 2021-03-22 PROCEDURE — 77063 BREAST TOMOSYNTHESIS BI: CPT | Mod: 26

## 2021-03-24 DIAGNOSIS — Z02.9 ENCOUNTER FOR ADMINISTRATIVE EXAMINATIONS, UNSPECIFIED: ICD-10-CM

## 2021-03-27 LAB
FERRITIN SERPL-MCNC: 55 NG/ML
HCT VFR BLD CALC: 42.2 %
HGB BLD-MCNC: 13.9 G/DL
IRON SATN MFR SERPL: 44 %
IRON SERPL-MCNC: 121 UG/DL
MCHC RBC-ENTMCNC: 31.8 PG
MCHC RBC-ENTMCNC: 32.9 G/DL
MCV RBC AUTO: 96.6 FL
PLATELET # BLD AUTO: 152 K/UL
PMV BLD: 10.5 FL
RBC # BLD: 4.37 M/UL
RBC # FLD: 11.6 %
TIBC SERPL-MCNC: 277 UG/DL
UIBC SERPL-MCNC: 156 UG/DL
WBC # FLD AUTO: 4.49 K/UL

## 2021-03-27 NOTE — PHYSICAL EXAM
[Fully active, able to carry on all pre-disease performance without restriction] : Status 0 - Fully active, able to carry on all pre-disease performance without restriction [Normal] : affect appropriate [de-identified] : irregular [de-identified] : arthritic changes to left hand. third left phalange swelling

## 2021-03-27 NOTE — REVIEW OF SYSTEMS
[Joint Pain] : joint pain [Joint Stiffness] : joint stiffness [Negative] : Allergic/Immunologic [Suicidal] : not suicidal [Insomnia] : no insomnia [Depression] : no depression [FreeTextEntry9] : left hand arthritic pain

## 2021-03-27 NOTE — HISTORY OF PRESENT ILLNESS
[de-identified] : 73 year old white female here referred by Dr. Pérez for iron deficiency anemia.  She had blood work on 926/18. CBC showed Hb 10.8, MCV 84.4, WBC 4.6, . Serum iron 28, % sat 7%, Ferritin 8, B12 335. Her renal function is normal. She admits to change in appetite since death of mother 2 years ago. She does not eat meat. She denies any sign of bleeding, n/v/d. She feels little anxious and dizziness. \par She had colonoscopy on 9/4/18 by Dr. Salazar. She was told that there was no abnormal finding. \par Endoscopy is scheduled to be done on 10/4/18.\par She is taking Aciphex x 15 years. She is concerning if this would cause iron deficiency.\par \par She has h/o seizure and has been taking phenobarbital. She had hysterectomy at 41 yo for menorrhagia.\par \par She does screening mammogram annually.\par  [de-identified] : 3/22/19: \par Pt returns for a follow-up visit. Due to iron deficiency, she received 5 doses of IV venofer 200 mg x 5 doses in Oct-Nov 2018. Her ferritin was 150 in Feb 2019 and percent sat 42%. Today's Hb is 13.8. No fresh complaints. No bleeding. S/P EGD. She was told by Dr Salazar that it was wnl. Capsule endoscopy was not done, given her stable Hb and iron stores. \par \par 6/3/19:\par The patient is here for followup visit. She has a h/o iron deficiency anemia and received  IV Venofer 200 mg x 5 doses in Oct-Nov 2018. She responded well. Her repeat CBC showed normal Hb. Celiac disease serology was negative. B12 and FA wnl. She does not have new complaints.\par \par 9/9/19\par The patient is here for followup visit. She has a h/o iron deficiency anemia and received  IV Venofer 200 mg x 5 doses in Oct-Nov 2018. She responded well. Her repeat CBC showed normal Hgb=14.2, Oshtoaxg=022. She is eating roast beef sandwiches daily. Celiac disease serology was negative. B12 and FA wnl. \par Her sister passed away last month from colon cancer. \par She c/o toothache but has appt with dentist tomorrow.\par \par 3/9/2020\par Patient is here today for follow up visit for h/o iron deficiency anemia.  She last received Venofer 200 mg IV x 5 doses in Oct-Nov 2018 which she responded well.  She feels tired but also busy doing many things...gardening, crafts, baking.\par CBC today shows wbc=4.46, Hgb=13.5, mtl=297. In the past, celiac disease serology was negative. B12 and FA wnl. \par She has appt with PCP, Dr. Pérez today for follow up care and refill of Phenobarbital for h/o grand mal seizures. \par \par 9/14/2020: DELBERT HASSAN is a 75 year old female here today for follow up visit for history of GINA. She last received Venofer 200 mg IV x 5 doses in Oct-Nov 2018 with favorable response. She states that she has increased fatigue in the afternoon. She denies any hematochezia, SOB, or palpitations. She attributes the increase fatigue secondary to taking phenobarbital for grand mal seizures. Celiac disease serology was negative. B12 and FA were normal. In addition, she has noticed some swelling and tenderness to the left hand and index finger. \par \par 03/22/2021\par DELBERT HASSAN is a 75 year old female here today for follow up visit for history of GINA.  She states that she has increased fatigue in the afternoon. She denies any hematochezia, SOB, or palpitations. She reports chronic left hand swelling and pain, she knits frequently.  In her previous visit, 9/2020, she reported left hand swelling and pain an Xray of the hand was completed which demonstrated Diffuse osteopenia without evidence of acute displaced fracture. Moderate to severe degenerative changes, consider CPPD. Second and third MCP joint effusion. Nonspecific soft tissue swelling of the second through fifth phalanges. Had mammogram done this morning. \par \par

## 2021-04-12 DIAGNOSIS — D50.9 IRON DEFICIENCY ANEMIA, UNSPECIFIED: ICD-10-CM

## 2021-04-12 DIAGNOSIS — D72.819 DECREASED WHITE BLOOD CELL COUNT, UNSPECIFIED: ICD-10-CM

## 2021-04-13 DIAGNOSIS — Z12.31 ENCOUNTER FOR SCREENING MAMMOGRAM FOR MALIGNANT NEOPLASM OF BREAST: ICD-10-CM

## 2021-09-27 ENCOUNTER — APPOINTMENT (OUTPATIENT)
Dept: HEMATOLOGY ONCOLOGY | Facility: CLINIC | Age: 76
End: 2021-09-27
Payer: MEDICARE

## 2021-09-27 ENCOUNTER — OUTPATIENT (OUTPATIENT)
Dept: OUTPATIENT SERVICES | Facility: HOSPITAL | Age: 76
LOS: 1 days | Discharge: HOME | End: 2021-09-27

## 2021-09-27 ENCOUNTER — LABORATORY RESULT (OUTPATIENT)
Age: 76
End: 2021-09-27

## 2021-09-27 VITALS
DIASTOLIC BLOOD PRESSURE: 67 MMHG | WEIGHT: 157 LBS | HEIGHT: 66 IN | BODY MASS INDEX: 25.23 KG/M2 | HEART RATE: 60 BPM | SYSTOLIC BLOOD PRESSURE: 153 MMHG | TEMPERATURE: 98 F

## 2021-09-27 LAB
HCT VFR BLD CALC: 43.3 %
HGB BLD-MCNC: 14.2 G/DL
MCHC RBC-ENTMCNC: 32.2 PG
MCHC RBC-ENTMCNC: 32.8 G/DL
MCV RBC AUTO: 98.2 FL
PLATELET # BLD AUTO: 162 K/UL
PMV BLD: 10.3 FL
RBC # BLD: 4.41 M/UL
RBC # FLD: 12.1 %
WBC # FLD AUTO: 5.31 K/UL

## 2021-09-27 PROCEDURE — 99213 OFFICE O/P EST LOW 20 MIN: CPT

## 2021-09-28 LAB
FERRITIN SERPL-MCNC: 54 NG/ML
IRON SATN MFR SERPL: 37 %
IRON SERPL-MCNC: 100 UG/DL
TIBC SERPL-MCNC: 271 UG/DL
UIBC SERPL-MCNC: 171 UG/DL

## 2021-09-28 NOTE — HISTORY OF PRESENT ILLNESS
[de-identified] : 73 year old white female here referred by Dr. Pérez for iron deficiency anemia.  She had blood work on 926/18. CBC showed Hb 10.8, MCV 84.4, WBC 4.6, . Serum iron 28, % sat 7%, Ferritin 8, B12 335. Her renal function is normal. She admits to change in appetite since death of mother 2 years ago. She does not eat meat. She denies any sign of bleeding, n/v/d. She feels little anxious and dizziness. \par She had colonoscopy on 9/4/18 by Dr. Salazar. She was told that there was no abnormal finding. \par Endoscopy is scheduled to be done on 10/4/18.\par She is taking Aciphex x 15 years. She is concerning if this would cause iron deficiency.\par \par She has h/o seizure and has been taking phenobarbital. She had hysterectomy at 39 yo for menorrhagia.\par \par She does screening mammogram annually.\par  [de-identified] : 3/22/19: \par Pt returns for a follow-up visit. Due to iron deficiency, she received 5 doses of IV venofer 200 mg x 5 doses in Oct-Nov 2018. Her ferritin was 150 in Feb 2019 and percent sat 42%. Today's Hb is 13.8. No fresh complaints. No bleeding. S/P EGD. She was told by Dr Salazar that it was wnl. Capsule endoscopy was not done, given her stable Hb and iron stores. \par \par 6/3/19:\par The patient is here for followup visit. She has a h/o iron deficiency anemia and received  IV Venofer 200 mg x 5 doses in Oct-Nov 2018. She responded well. Her repeat CBC showed normal Hb. Celiac disease serology was negative. B12 and FA wnl. She does not have new complaints.\par \par 9/9/19\par The patient is here for followup visit. She has a h/o iron deficiency anemia and received  IV Venofer 200 mg x 5 doses in Oct-Nov 2018. She responded well. Her repeat CBC showed normal Hgb=14.2, Wgzbwbzr=002. She is eating roast beef sandwiches daily. Celiac disease serology was negative. B12 and FA wnl. \par Her sister passed away last month from colon cancer. \par She c/o toothache but has appt with dentist tomorrow.\par \par 3/9/2020\par Patient is here today for follow up visit for h/o iron deficiency anemia.  She last received Venofer 200 mg IV x 5 doses in Oct-Nov 2018 which she responded well.  She feels tired but also busy doing many things...gardening, crafts, baking.\par CBC today shows wbc=4.46, Hgb=13.5, lvj=430. In the past, celiac disease serology was negative. B12 and FA wnl. \par She has appt with PCP, Dr. Pérez today for follow up care and refill of Phenobarbital for h/o grand mal seizures. \par \par 9/14/2020: DELBERT HASSAN is a 75 year old female here today for follow up visit for history of GINA. She last received Venofer 200 mg IV x 5 doses in Oct-Nov 2018 with favorable response. She states that she has increased fatigue in the afternoon. She denies any hematochezia, SOB, or palpitations. She attributes the increase fatigue secondary to taking phenobarbital for grand mal seizures. Celiac disease serology was negative. B12 and FA were normal. In addition, she has noticed some swelling and tenderness to the left hand and index finger. \par \par 03/22/2021amber HASSAN is a 75 year old female here today for follow up visit for history of GINA.  She states that she has increased fatigue in the afternoon. She denies any hematochezia, SOB, or palpitations. She reports chronic left hand swelling and pain, she knits frequently.  In her previous visit, 9/2020, she reported left hand swelling and pain an Xray of the hand was completed which demonstrated Diffuse osteopenia without evidence of acute displaced fracture. Moderate to severe degenerative changes, consider CPPD. Second and third MCP joint effusion. Nonspecific soft tissue swelling of the second through fifth phalanges. Had mammogram done this morning. \par \par 09/27/2021amber HASSAN is a 75 year old female here today for follow up visit for history of GINA. In her previous visit, 9/2020, she reported left hand swelling and pain. an Xray of the hand was completed which demonstrated Diffuse osteopenia without evidence of acute displaced fracture. Moderate to severe degenerative changes, consider CPPD. Second and third MCP joint effusion. Nonspecific soft tissue swelling of the second through fifth phalanges. She is doing well today, no complaints regarding melena or hematochezia.  Her hemoglobin today is 14.2 g/dL. Her main concern today is that her  was found to have a colon mass on virtual colonoscopy.\par

## 2021-09-28 NOTE — PHYSICAL EXAM
[Fully active, able to carry on all pre-disease performance without restriction] : Status 0 - Fully active, able to carry on all pre-disease performance without restriction [Normal] : affect appropriate [de-identified] : irregular [de-identified] : arthritic changes to left hand. third left phalange swelling

## 2021-09-28 NOTE — ASSESSMENT
[FreeTextEntry1] : Iron Deficiency Anemia.\par Chronic leukopenia.\par \par PLAN:\par -- Reviewed repeat CBC from today. It shows normal Hb and resolved leukopenia. \par -- Will order blood work for iron studies, TIBC, % sat, Ferritin..\par -- F/u hand surgeon for left hand swelling and left third phalange swelling. \par -- Continue observation.\par -- RTO for followup in 6 months.\par \par Case was seen and discussed with Dr. Hernandez who agreed with the assessment and plan.\par \par

## 2021-10-04 DIAGNOSIS — Z51.81 ENCOUNTER FOR THERAPEUTIC DRUG LEVEL MONITORING: ICD-10-CM

## 2021-10-04 DIAGNOSIS — D64.9 ANEMIA, UNSPECIFIED: ICD-10-CM

## 2021-10-04 DIAGNOSIS — Z85.6 PERSONAL HISTORY OF LEUKEMIA: ICD-10-CM

## 2021-10-04 DIAGNOSIS — K62.89 OTHER SPECIFIED DISEASES OF ANUS AND RECTUM: ICD-10-CM

## 2022-03-28 ENCOUNTER — LABORATORY RESULT (OUTPATIENT)
Age: 77
End: 2022-03-28

## 2022-03-28 ENCOUNTER — OUTPATIENT (OUTPATIENT)
Dept: OUTPATIENT SERVICES | Facility: HOSPITAL | Age: 77
LOS: 1 days | Discharge: HOME | End: 2022-03-28

## 2022-03-28 ENCOUNTER — APPOINTMENT (OUTPATIENT)
Dept: HEMATOLOGY ONCOLOGY | Facility: CLINIC | Age: 77
End: 2022-03-28
Payer: MEDICARE

## 2022-03-28 VITALS
HEIGHT: 62 IN | HEART RATE: 62 BPM | DIASTOLIC BLOOD PRESSURE: 55 MMHG | BODY MASS INDEX: 29.81 KG/M2 | TEMPERATURE: 97.9 F | RESPIRATION RATE: 18 BRPM | SYSTOLIC BLOOD PRESSURE: 185 MMHG | WEIGHT: 162 LBS

## 2022-03-28 PROCEDURE — 99213 OFFICE O/P EST LOW 20 MIN: CPT

## 2022-03-29 NOTE — ASSESSMENT
[FreeTextEntry1] : Iron Deficiency Anemia.\par Chronic leukopenia.\par \par PLAN:\par -- Reviewed repeat CBC from today. It shows normal Hb and resolved leukopenia. \par -- Will order blood work for iron studies, TIBC, % sat, Ferritin..\par -- Continue observation.\par -- RTO for followup in 6 months.\par \par Case was seen and discussed with Dr. Hernandez who agreed with the assessment and plan.\par \par

## 2022-03-29 NOTE — HISTORY OF PRESENT ILLNESS
[de-identified] : 73 year old white female here referred by Dr. Pérez for iron deficiency anemia.  She had blood work on 926/18. CBC showed Hb 10.8, MCV 84.4, WBC 4.6, . Serum iron 28, % sat 7%, Ferritin 8, B12 335. Her renal function is normal. She admits to change in appetite since death of mother 2 years ago. She does not eat meat. She denies any sign of bleeding, n/v/d. She feels little anxious and dizziness. \par She had colonoscopy on 9/4/18 by Dr. Salazar. She was told that there was no abnormal finding. \par Endoscopy is scheduled to be done on 10/4/18.\par She is taking Aciphex x 15 years. She is concerning if this would cause iron deficiency.\par \par She has h/o seizure and has been taking phenobarbital. She had hysterectomy at 39 yo for menorrhagia.\par \par She does screening mammogram annually.\par  [de-identified] : 3/22/19: \par Pt returns for a follow-up visit. Due to iron deficiency, she received 5 doses of IV venofer 200 mg x 5 doses in Oct-Nov 2018. Her ferritin was 150 in Feb 2019 and percent sat 42%. Today's Hb is 13.8. No fresh complaints. No bleeding. S/P EGD. She was told by Dr Salazar that it was wnl. Capsule endoscopy was not done, given her stable Hb and iron stores. \par \par 6/3/19:\par The patient is here for followup visit. She has a h/o iron deficiency anemia and received  IV Venofer 200 mg x 5 doses in Oct-Nov 2018. She responded well. Her repeat CBC showed normal Hb. Celiac disease serology was negative. B12 and FA wnl. She does not have new complaints.\par \par 9/9/19\par The patient is here for followup visit. She has a h/o iron deficiency anemia and received  IV Venofer 200 mg x 5 doses in Oct-Nov 2018. She responded well. Her repeat CBC showed normal Hgb=14.2, Tmqsunng=499. She is eating roast beef sandwiches daily. Celiac disease serology was negative. B12 and FA wnl. \par Her sister passed away last month from colon cancer. \par She c/o toothache but has appt with dentist tomorrow.\par \par 3/9/2020\par Patient is here today for follow up visit for h/o iron deficiency anemia.  She last received Venofer 200 mg IV x 5 doses in Oct-Nov 2018 which she responded well.  She feels tired but also busy doing many things...gardening, crafts, baking.\par CBC today shows wbc=4.46, Hgb=13.5, wrk=090. In the past, celiac disease serology was negative. B12 and FA wnl. \par She has appt with PCP, Dr. Pérez today for follow up care and refill of Phenobarbital for h/o grand mal seizures. \par \par 9/14/2020: DELBERT HASSAN is a 75 year old female here today for follow up visit for history of GINA. She last received Venofer 200 mg IV x 5 doses in Oct-Nov 2018 with favorable response. She states that she has increased fatigue in the afternoon. She denies any hematochezia, SOB, or palpitations. She attributes the increase fatigue secondary to taking phenobarbital for grand mal seizures. Celiac disease serology was negative. B12 and FA were normal. In addition, she has noticed some swelling and tenderness to the left hand and index finger. \par \par 03/22/2021amber HASSAN is a 75 year old female here today for follow up visit for history of GINA.  She states that she has increased fatigue in the afternoon. She denies any hematochezia, SOB, or palpitations. She reports chronic left hand swelling and pain, she knits frequently.  In her previous visit, 9/2020, she reported left hand swelling and pain an Xray of the hand was completed which demonstrated Diffuse osteopenia without evidence of acute displaced fracture. Moderate to severe degenerative changes, consider CPPD. Second and third MCP joint effusion. Nonspecific soft tissue swelling of the second through fifth phalanges. Had mammogram done this morning. \par \par 09/27/2021amber HASSAN is a 75 year old female here today for follow up visit for history of GINA. In her previous visit, 9/2020, she reported left hand swelling and pain. an Xray of the hand was completed which demonstrated Diffuse osteopenia without evidence of acute displaced fracture. Moderate to severe degenerative changes, consider CPPD. Second and third MCP joint effusion. Nonspecific soft tissue swelling of the second through fifth phalanges. She is doing well today, no complaints regarding melena or hematochezia.  Her hemoglobin today is 14.2 g/dL. Her main concern today is that her  was found to have a colon mass on virtual colonoscopy.\par \par 3/28/22amber HASSAN is a 76 year old female here today for follow up visit for history of GINA. In her previous visit, 9/2020, she reported left hand swelling and pain. an Xray of the hand was completed which demonstrated Diffuse osteopenia without evidence of acute displaced fracture. Moderate to severe degenerative changes, consider CPPD. Second and third MCP joint effusion. Nonspecific soft tissue swelling of the second through fifth phalanges. She is doing well today, no complaints regarding melena or hematochezia.  Her hemoglobin today is 14.0 g/dL. She feels well and denies any complaints. She is nervous in regards to her 's upcoming surgery on Friday.

## 2022-03-29 NOTE — PHYSICAL EXAM
[Fully active, able to carry on all pre-disease performance without restriction] : Status 0 - Fully active, able to carry on all pre-disease performance without restriction [Normal] : affect appropriate [de-identified] : irregular [de-identified] : arthritic changes to left hand. third left phalange swelling

## 2022-03-30 DIAGNOSIS — D72.819 DECREASED WHITE BLOOD CELL COUNT, UNSPECIFIED: ICD-10-CM

## 2022-03-30 DIAGNOSIS — D50.9 IRON DEFICIENCY ANEMIA, UNSPECIFIED: ICD-10-CM

## 2022-04-13 ENCOUNTER — OUTPATIENT (OUTPATIENT)
Dept: OUTPATIENT SERVICES | Facility: HOSPITAL | Age: 77
LOS: 1 days | Discharge: HOME | End: 2022-04-13
Payer: MEDICARE

## 2022-04-13 DIAGNOSIS — Z12.31 ENCOUNTER FOR SCREENING MAMMOGRAM FOR MALIGNANT NEOPLASM OF BREAST: ICD-10-CM

## 2022-04-13 PROCEDURE — 77063 BREAST TOMOSYNTHESIS BI: CPT | Mod: 26

## 2022-04-13 PROCEDURE — 77067 SCR MAMMO BI INCL CAD: CPT | Mod: 26

## 2022-09-28 ENCOUNTER — OUTPATIENT (OUTPATIENT)
Dept: OUTPATIENT SERVICES | Facility: HOSPITAL | Age: 77
LOS: 1 days | End: 2022-09-28

## 2022-09-28 ENCOUNTER — APPOINTMENT (OUTPATIENT)
Dept: HEMATOLOGY ONCOLOGY | Facility: CLINIC | Age: 77
End: 2022-09-28

## 2022-09-28 VITALS
HEART RATE: 67 BPM | RESPIRATION RATE: 18 BRPM | WEIGHT: 162 LBS | BODY MASS INDEX: 29.81 KG/M2 | DIASTOLIC BLOOD PRESSURE: 62 MMHG | TEMPERATURE: 97.3 F | OXYGEN SATURATION: 98 % | SYSTOLIC BLOOD PRESSURE: 172 MMHG | HEIGHT: 62 IN

## 2022-09-28 LAB
FERRITIN SERPL-MCNC: 39 NG/ML
HCT VFR BLD CALC: 41.7 %
HGB BLD-MCNC: 14 G/DL
IRON SATN MFR SERPL: 44 %
IRON SERPL-MCNC: 138 UG/DL
MCHC RBC-ENTMCNC: 32 PG
MCHC RBC-ENTMCNC: 33.6 G/DL
MCV RBC AUTO: 95.2 FL
PLATELET # BLD AUTO: 144 K/UL
PMV BLD: 11 FL
RBC # BLD: 4.38 M/UL
RBC # FLD: 12.2 %
TIBC SERPL-MCNC: 314 UG/DL
UIBC SERPL-MCNC: 176 UG/DL
WBC # FLD AUTO: 5.07 K/UL

## 2022-09-28 PROCEDURE — 99212 OFFICE O/P EST SF 10 MIN: CPT

## 2022-09-28 NOTE — PHYSICAL EXAM
[Fully active, able to carry on all pre-disease performance without restriction] : Status 0 - Fully active, able to carry on all pre-disease performance without restriction [Normal] : affect appropriate [de-identified] : irregular [de-identified] : arthritic changes to left hand. third left phalange swelling

## 2022-09-28 NOTE — HISTORY OF PRESENT ILLNESS
[de-identified] : 73 year old white female here referred by Dr. Pérez for iron deficiency anemia.  She had blood work on 926/18. CBC showed Hb 10.8, MCV 84.4, WBC 4.6, . Serum iron 28, % sat 7%, Ferritin 8, B12 335. Her renal function is normal. She admits to change in appetite since death of mother 2 years ago. She does not eat meat. She denies any sign of bleeding, n/v/d. She feels little anxious and dizziness. \par She had colonoscopy on 9/4/18 by Dr. Salazar. She was told that there was no abnormal finding. \par Endoscopy is scheduled to be done on 10/4/18.\par She is taking Aciphex x 15 years. She is concerning if this would cause iron deficiency.\par \par She has h/o seizure and has been taking phenobarbital. She had hysterectomy at 39 yo for menorrhagia.\par \par She does screening mammogram annually.\par  [de-identified] : 3/22/19: \par Pt returns for a follow-up visit. Due to iron deficiency, she received 5 doses of IV venofer 200 mg x 5 doses in Oct-Nov 2018. Her ferritin was 150 in Feb 2019 and percent sat 42%. Today's Hb is 13.8. No fresh complaints. No bleeding. S/P EGD. She was told by Dr Salazar that it was wnl. Capsule endoscopy was not done, given her stable Hb and iron stores. \par \par 6/3/19:\par The patient is here for followup visit. She has a h/o iron deficiency anemia and received  IV Venofer 200 mg x 5 doses in Oct-Nov 2018. She responded well. Her repeat CBC showed normal Hb. Celiac disease serology was negative. B12 and FA wnl. She does not have new complaints.\par \par 9/9/19\par The patient is here for followup visit. She has a h/o iron deficiency anemia and received  IV Venofer 200 mg x 5 doses in Oct-Nov 2018. She responded well. Her repeat CBC showed normal Hgb=14.2, Jcqusjuf=804. She is eating roast beef sandwiches daily. Celiac disease serology was negative. B12 and FA wnl. \par Her sister passed away last month from colon cancer. \par She c/o toothache but has appt with dentist tomorrow.\par \par 3/9/2020\par Patient is here today for follow up visit for h/o iron deficiency anemia.  She last received Venofer 200 mg IV x 5 doses in Oct-Nov 2018 which she responded well.  She feels tired but also busy doing many things...gardening, crafts, baking.\par CBC today shows wbc=4.46, Hgb=13.5, vpn=453. In the past, celiac disease serology was negative. B12 and FA wnl. \par She has appt with PCP, Dr. Pérez today for follow up care and refill of Phenobarbital for h/o grand mal seizures. \par \par 9/14/2020: DELBERT HASSAN is a 75 year old female here today for follow up visit for history of GINA. She last received Venofer 200 mg IV x 5 doses in Oct-Nov 2018 with favorable response. She states that she has increased fatigue in the afternoon. She denies any hematochezia, SOB, or palpitations. She attributes the increase fatigue secondary to taking phenobarbital for grand mal seizures. Celiac disease serology was negative. B12 and FA were normal. In addition, she has noticed some swelling and tenderness to the left hand and index finger. \par \par 03/22/2021amber HASSAN is a 75 year old female here today for follow up visit for history of GINA.  She states that she has increased fatigue in the afternoon. She denies any hematochezia, SOB, or palpitations. She reports chronic left hand swelling and pain, she knits frequently.  In her previous visit, 9/2020, she reported left hand swelling and pain an Xray of the hand was completed which demonstrated Diffuse osteopenia without evidence of acute displaced fracture. Moderate to severe degenerative changes, consider CPPD. Second and third MCP joint effusion. Nonspecific soft tissue swelling of the second through fifth phalanges. Had mammogram done this morning. \par \par 09/27/2021amber HASSAN is a 75 year old female here today for follow up visit for history of GINA. In her previous visit, 9/2020, she reported left hand swelling and pain. an Xray of the hand was completed which demonstrated Diffuse osteopenia without evidence of acute displaced fracture. Moderate to severe degenerative changes, consider CPPD. Second and third MCP joint effusion. Nonspecific soft tissue swelling of the second through fifth phalanges. She is doing well today, no complaints regarding melena or hematochezia.  Her hemoglobin today is 14.2 g/dL. Her main concern today is that her  was found to have a colon mass on virtual colonoscopy.\par \par 3/28/22amber HASSAN is a 76 year old female here today for follow up visit for history of GINA. In her previous visit, 9/2020, she reported left hand swelling and pain. an Xray of the hand was completed which demonstrated Diffuse osteopenia without evidence of acute displaced fracture. Moderate to severe degenerative changes, consider CPPD. Second and third MCP joint effusion. Nonspecific soft tissue swelling of the second through fifth phalanges. She is doing well today, no complaints regarding melena or hematochezia.  Her hemoglobin today is 14.0 g/dL. She feels well and denies any complaints. She is nervous in regards to her 's upcoming surgery on Friday. \par \par 9/28/22:\par DELBERT is here today for follow up visit for history of GINA. Repeat CBC in 3/3033 showed normal Hb at 14 and normal serum Fe, % saturation and ferritin. She is feeling well and does not have new complains.

## 2022-09-28 NOTE — REVIEW OF SYSTEMS
[Joint Pain] : joint pain [Joint Stiffness] : joint stiffness [Suicidal] : not suicidal [Insomnia] : no insomnia [Depression] : no depression [Negative] : Allergic/Immunologic [FreeTextEntry9] : left hand arthritic pain

## 2022-09-28 NOTE — ASSESSMENT
[FreeTextEntry1] : Iron Deficiency Anemia.\par Chronic leukopenia.\par \par PLAN:\par -- Will order blood work for iron studies, TIBC, % sat, Ferritin..\par -- Continue observation.\par -- RTO for followup in 6 months.\par \par \par

## 2022-09-29 DIAGNOSIS — D72.819 DECREASED WHITE BLOOD CELL COUNT, UNSPECIFIED: ICD-10-CM

## 2022-09-29 DIAGNOSIS — D50.9 IRON DEFICIENCY ANEMIA, UNSPECIFIED: ICD-10-CM

## 2023-03-29 ENCOUNTER — APPOINTMENT (OUTPATIENT)
Dept: HEMATOLOGY ONCOLOGY | Facility: CLINIC | Age: 78
End: 2023-03-29

## 2023-04-05 ENCOUNTER — OUTPATIENT (OUTPATIENT)
Dept: OUTPATIENT SERVICES | Facility: HOSPITAL | Age: 78
LOS: 1 days | End: 2023-04-05
Payer: MEDICARE

## 2023-04-05 DIAGNOSIS — Z12.31 ENCOUNTER FOR SCREENING MAMMOGRAM FOR MALIGNANT NEOPLASM OF BREAST: ICD-10-CM

## 2023-04-05 DIAGNOSIS — Z00.8 ENCOUNTER FOR OTHER GENERAL EXAMINATION: ICD-10-CM

## 2023-04-05 PROCEDURE — 77063 BREAST TOMOSYNTHESIS BI: CPT | Mod: 26

## 2023-04-05 PROCEDURE — 77063 BREAST TOMOSYNTHESIS BI: CPT

## 2023-04-05 PROCEDURE — 77067 SCR MAMMO BI INCL CAD: CPT | Mod: 26

## 2023-04-05 PROCEDURE — 77067 SCR MAMMO BI INCL CAD: CPT

## 2023-04-06 DIAGNOSIS — Z12.31 ENCOUNTER FOR SCREENING MAMMOGRAM FOR MALIGNANT NEOPLASM OF BREAST: ICD-10-CM

## 2023-05-08 ENCOUNTER — OUTPATIENT (OUTPATIENT)
Dept: OUTPATIENT SERVICES | Facility: HOSPITAL | Age: 78
LOS: 1 days | End: 2023-05-08
Payer: MEDICARE

## 2023-05-08 ENCOUNTER — APPOINTMENT (OUTPATIENT)
Dept: HEMATOLOGY ONCOLOGY | Facility: CLINIC | Age: 78
End: 2023-05-08
Payer: MEDICARE

## 2023-05-08 ENCOUNTER — LABORATORY RESULT (OUTPATIENT)
Age: 78
End: 2023-05-08

## 2023-05-08 VITALS
SYSTOLIC BLOOD PRESSURE: 170 MMHG | WEIGHT: 155 LBS | HEIGHT: 62 IN | BODY MASS INDEX: 28.52 KG/M2 | HEART RATE: 65 BPM | RESPIRATION RATE: 16 BRPM | DIASTOLIC BLOOD PRESSURE: 77 MMHG | TEMPERATURE: 98 F

## 2023-05-08 DIAGNOSIS — D72.819 DECREASED WHITE BLOOD CELL COUNT, UNSPECIFIED: ICD-10-CM

## 2023-05-08 LAB
BASOPHILS # BLD AUTO: 0.03 K/UL
BASOPHILS NFR BLD AUTO: 0.6 %
EOSINOPHIL # BLD AUTO: 0.19 K/UL
EOSINOPHIL NFR BLD AUTO: 3.6 %
FERRITIN SERPL-MCNC: 48 NG/ML
HCT VFR BLD CALC: 39.5 %
HCT VFR BLD CALC: 40.6 %
HGB BLD-MCNC: 13.5 G/DL
HGB BLD-MCNC: 13.5 G/DL
IMM GRANULOCYTES NFR BLD AUTO: 0.2 %
IRON SATN MFR SERPL: 34 %
IRON SATN MFR SERPL: 35 %
IRON SERPL-MCNC: 93 UG/DL
IRON SERPL-MCNC: 97 UG/DL
LYMPHOCYTES # BLD AUTO: 2.1 K/UL
LYMPHOCYTES NFR BLD AUTO: 40 %
MAN DIFF?: NORMAL
MCHC RBC-ENTMCNC: 31.9 PG
MCHC RBC-ENTMCNC: 32.8 PG
MCHC RBC-ENTMCNC: 33.3 G/DL
MCHC RBC-ENTMCNC: 34.2 G/DL
MCV RBC AUTO: 95.9 FL
MCV RBC AUTO: 96 FL
MONOCYTES # BLD AUTO: 0.62 K/UL
MONOCYTES NFR BLD AUTO: 11.8 %
NEUTROPHILS # BLD AUTO: 2.3 K/UL
NEUTROPHILS NFR BLD AUTO: 43.8 %
PLATELET # BLD AUTO: 154 K/UL
PLATELET # BLD AUTO: 164 K/UL
PMV BLD: 10.3 FL
RBC # BLD: 4.12 M/UL
RBC # BLD: 4.23 M/UL
RBC # FLD: 12 %
RBC # FLD: 12 %
TIBC SERPL-MCNC: 269 UG/DL
TIBC SERPL-MCNC: 287 UG/DL
UIBC SERPL-MCNC: 176 UG/DL
UIBC SERPL-MCNC: 190 UG/DL
WBC # FLD AUTO: 4.67 K/UL
WBC # FLD AUTO: 5.25 K/UL

## 2023-05-08 PROCEDURE — 99213 OFFICE O/P EST LOW 20 MIN: CPT

## 2023-05-08 PROCEDURE — 85027 COMPLETE CBC AUTOMATED: CPT

## 2023-05-08 PROCEDURE — 82728 ASSAY OF FERRITIN: CPT

## 2023-05-08 PROCEDURE — 83540 ASSAY OF IRON: CPT

## 2023-05-08 PROCEDURE — 83550 IRON BINDING TEST: CPT

## 2023-05-09 DIAGNOSIS — D72.819 DECREASED WHITE BLOOD CELL COUNT, UNSPECIFIED: ICD-10-CM

## 2023-05-09 LAB — FERRITIN SERPL-MCNC: 42 NG/ML

## 2023-05-13 NOTE — REVIEW OF SYSTEMS
[Joint Pain] : joint pain [Suicidal] : not suicidal [Joint Stiffness] : joint stiffness [Insomnia] : no insomnia [Depression] : no depression [Negative] : Allergic/Immunologic yes [FreeTextEntry9] : left hand arthritic pain

## 2023-05-13 NOTE — PHYSICAL EXAM
[Fully active, able to carry on all pre-disease performance without restriction] : Status 0 - Fully active, able to carry on all pre-disease performance without restriction [Normal] : affect appropriate [de-identified] : arthritic changes to left hand. third left phalange swelling [de-identified] : irregular

## 2023-05-13 NOTE — HISTORY OF PRESENT ILLNESS
[de-identified] : 73 year old white female here referred by Dr. Pérez for iron deficiency anemia.  She had blood work on 926/18. CBC showed Hb 10.8, MCV 84.4, WBC 4.6, . Serum iron 28, % sat 7%, Ferritin 8, B12 335. Her renal function is normal. She admits to change in appetite since death of mother 2 years ago. She does not eat meat. She denies any sign of bleeding, n/v/d. She feels little anxious and dizziness. \par She had colonoscopy on 9/4/18 by Dr. Salazar. She was told that there was no abnormal finding. \par Endoscopy is scheduled to be done on 10/4/18.\par She is taking Aciphex x 15 years. She is concerning if this would cause iron deficiency.\par \par She has h/o seizure and has been taking phenobarbital. She had hysterectomy at 41 yo for menorrhagia.\par \par She does screening mammogram annually.\par  [de-identified] : 3/22/19: \par Pt returns for a follow-up visit. Due to iron deficiency, she received 5 doses of IV venofer 200 mg x 5 doses in Oct-Nov 2018. Her ferritin was 150 in Feb 2019 and percent sat 42%. Today's Hb is 13.8. No fresh complaints. No bleeding. S/P EGD. She was told by Dr Salazar that it was wnl. Capsule endoscopy was not done, given her stable Hb and iron stores. \par \par 6/3/19:\par The patient is here for followup visit. She has a h/o iron deficiency anemia and received  IV Venofer 200 mg x 5 doses in Oct-Nov 2018. She responded well. Her repeat CBC showed normal Hb. Celiac disease serology was negative. B12 and FA wnl. She does not have new complaints.\par \par 9/9/19\par The patient is here for followup visit. She has a h/o iron deficiency anemia and received  IV Venofer 200 mg x 5 doses in Oct-Nov 2018. She responded well. Her repeat CBC showed normal Hgb=14.2, Sdqwjhfb=521. She is eating roast beef sandwiches daily. Celiac disease serology was negative. B12 and FA wnl. \par Her sister passed away last month from colon cancer. \par She c/o toothache but has appt with dentist tomorrow.\par \par 3/9/2020\par Patient is here today for follow up visit for h/o iron deficiency anemia.  She last received Venofer 200 mg IV x 5 doses in Oct-Nov 2018 which she responded well.  She feels tired but also busy doing many things...gardening, crafts, baking.\par CBC today shows wbc=4.46, Hgb=13.5, oty=727. In the past, celiac disease serology was negative. B12 and FA wnl. \par She has appt with PCP, Dr. Pérez today for follow up care and refill of Phenobarbital for h/o grand mal seizures. \par \par 9/14/2020: DELBERT HASSAN is a 75 year old female here today for follow up visit for history of GINA. She last received Venofer 200 mg IV x 5 doses in Oct-Nov 2018 with favorable response. She states that she has increased fatigue in the afternoon. She denies any hematochezia, SOB, or palpitations. She attributes the increase fatigue secondary to taking phenobarbital for grand mal seizures. Celiac disease serology was negative. B12 and FA were normal. In addition, she has noticed some swelling and tenderness to the left hand and index finger. \par \par 03/22/2021amber HASSAN is a 75 year old female here today for follow up visit for history of GINA.  She states that she has increased fatigue in the afternoon. She denies any hematochezia, SOB, or palpitations. She reports chronic left hand swelling and pain, she knits frequently.  In her previous visit, 9/2020, she reported left hand swelling and pain an Xray of the hand was completed which demonstrated Diffuse osteopenia without evidence of acute displaced fracture. Moderate to severe degenerative changes, consider CPPD. Second and third MCP joint effusion. Nonspecific soft tissue swelling of the second through fifth phalanges. Had mammogram done this morning. \par \par 09/27/2021amber HASSAN is a 75 year old female here today for follow up visit for history of GINA. In her previous visit, 9/2020, she reported left hand swelling and pain. an Xray of the hand was completed which demonstrated Diffuse osteopenia without evidence of acute displaced fracture. Moderate to severe degenerative changes, consider CPPD. Second and third MCP joint effusion. Nonspecific soft tissue swelling of the second through fifth phalanges. She is doing well today, no complaints regarding melena or hematochezia.  Her hemoglobin today is 14.2 g/dL. Her main concern today is that her  was found to have a colon mass on virtual colonoscopy.\par \par 3/28/22amber HASSAN is a 76 year old female here today for follow up visit for history of GINA. In her previous visit, 9/2020, she reported left hand swelling and pain. an Xray of the hand was completed which demonstrated Diffuse osteopenia without evidence of acute displaced fracture. Moderate to severe degenerative changes, consider CPPD. Second and third MCP joint effusion. Nonspecific soft tissue swelling of the second through fifth phalanges. She is doing well today, no complaints regarding melena or hematochezia.  Her hemoglobin today is 14.0 g/dL. She feels well and denies any complaints. She is nervous in regards to her 's upcoming surgery on Friday. \par \par 9/28/22:\par DELBERT is here today for follow up visit for history of GINA. Repeat CBC in 3/3033 showed normal Hb at 14 and normal serum Fe, % saturation and ferritin. She is feeling well and does not have new complains.\par \par 5/8/23:\par DELBERT is here today for follow up visit for history of GINA.  She is feeling well and does not have new complains. Repeat CBC today shows normal Hb. Iron study showed normal serum Fe, % saturation and ferritin.

## 2023-05-15 DIAGNOSIS — D50.9 IRON DEFICIENCY ANEMIA, UNSPECIFIED: ICD-10-CM

## 2023-06-16 ENCOUNTER — APPOINTMENT (OUTPATIENT)
Dept: CARDIOLOGY | Facility: CLINIC | Age: 78
End: 2023-06-16
Payer: MEDICARE

## 2023-06-16 VITALS — HEIGHT: 62 IN | HEART RATE: 73 BPM | WEIGHT: 156 LBS | OXYGEN SATURATION: 98 % | BODY MASS INDEX: 28.71 KG/M2

## 2023-06-16 VITALS — DIASTOLIC BLOOD PRESSURE: 80 MMHG | SYSTOLIC BLOOD PRESSURE: 130 MMHG

## 2023-06-16 PROCEDURE — 99204 OFFICE O/P NEW MOD 45 MIN: CPT

## 2023-06-16 PROCEDURE — 93000 ELECTROCARDIOGRAM COMPLETE: CPT

## 2023-06-16 NOTE — PHYSICAL EXAM
[Normal Venous Pressure] : normal venous pressure [Normal S1, S2] : normal S1, S2 [Clear Lung Fields] : clear lung fields [Soft] : abdomen soft [No Edema] : no edema [de-identified] : rrr

## 2023-06-16 NOTE — DISCUSSION/SUMMARY
[FreeTextEntry1] : will get 2 d echo \par get stress nuclear lexiscan \par not sure if sob \par does not want to change statin  [EKG obtained to assist in diagnosis and management of assessed problem(s)] : EKG obtained to assist in diagnosis and management of assessed problem(s)

## 2023-06-16 NOTE — HISTORY OF PRESENT ILLNESS
[FreeTextEntry1] : pt with FE def anemia, HTN, HLD, seizure disorder  [facility] CUFF ACCURATE \par HDL: 57, T, LDL: 102\par bp: 118/54 /67 \par pt bp only high if goes for a doctors appointment. pt worried about . \par  HDL 57 . \par pt says woke up and could not breathe for a couple seconds and felt like gonna die, woke up from sleep. pt says occurred one time before. \par pt walks dog slow and denies cp or sob, pt unable to walk fast with leg. PT HAS KNEE SURGERY ON LEFT KNEE. \par pt denies syncope.

## 2023-08-08 ENCOUNTER — APPOINTMENT (OUTPATIENT)
Dept: CARDIOLOGY | Facility: CLINIC | Age: 78
End: 2023-08-08
Payer: MEDICARE

## 2023-08-08 PROCEDURE — 93880 EXTRACRANIAL BILAT STUDY: CPT

## 2023-08-08 PROCEDURE — 93306 TTE W/DOPPLER COMPLETE: CPT

## 2023-09-08 NOTE — PHYSICAL EXAM
[Normal Venous Pressure] : normal venous pressure [Normal S1, S2] : normal S1, S2 [Clear Lung Fields] : clear lung fields [Soft] : abdomen soft [No Edema] : no edema [de-identified] : rrr

## 2023-09-08 NOTE — DISCUSSION/SUMMARY
[FreeTextEntry1] : will get 2 d echo  get stress nuclear lexiscan  not sure if sob  does not want to change statin

## 2023-09-08 NOTE — HISTORY OF PRESENT ILLNESS
[FreeTextEntry1] : pt with FE def anemia, HTN, HLD, seizure disorder  [facility] CUFF ACCURATE  HDL: 57, T, LDL: 102 bp: 118/54 /67  pt bp only high if goes for a doctors appointment. pt worried about .   HDL 57 .  pt says woke up and could not breathe for a couple seconds and felt like gonna die, woke up from sleep. pt says occurred one time before.  pt walks dog slow and denies cp or sob, pt unable to walk fast with leg. PT HAS KNEE SURGERY ON LEFT KNEE.  pt denies syncope.   23: 23: EF: 55-60%, E' sept: 0.06, E/e': 15, CO: 5.9, DD1, mild MR, mild AR

## 2023-09-13 ENCOUNTER — APPOINTMENT (OUTPATIENT)
Dept: CARDIOLOGY | Facility: CLINIC | Age: 78
End: 2023-09-13
Payer: MEDICARE

## 2023-09-13 VITALS — BODY MASS INDEX: 28.16 KG/M2 | WEIGHT: 153 LBS | HEIGHT: 62 IN

## 2023-09-13 VITALS — DIASTOLIC BLOOD PRESSURE: 70 MMHG | SYSTOLIC BLOOD PRESSURE: 130 MMHG

## 2023-09-13 DIAGNOSIS — R06.02 SHORTNESS OF BREATH: ICD-10-CM

## 2023-09-13 DIAGNOSIS — E78.2 MIXED HYPERLIPIDEMIA: ICD-10-CM

## 2023-09-13 DIAGNOSIS — I10 ESSENTIAL (PRIMARY) HYPERTENSION: ICD-10-CM

## 2023-09-13 DIAGNOSIS — R03.0 ELEVATED BLOOD-PRESSURE READING, W/OUT DIAGNOSIS OF HYPERTENSION: ICD-10-CM

## 2023-09-13 DIAGNOSIS — I65.23 OCCLUSION AND STENOSIS OF BILATERAL CAROTID ARTERIES: ICD-10-CM

## 2023-09-13 PROCEDURE — 99214 OFFICE O/P EST MOD 30 MIN: CPT

## 2023-09-13 RX ORDER — SIMVASTATIN 10 MG/1
10 TABLET, FILM COATED ORAL
Refills: 0 | Status: DISCONTINUED | COMMUNITY
End: 2023-09-13

## 2023-09-18 ENCOUNTER — OUTPATIENT (OUTPATIENT)
Dept: OUTPATIENT SERVICES | Facility: HOSPITAL | Age: 78
LOS: 1 days | End: 2023-09-18
Payer: MEDICARE

## 2023-09-18 ENCOUNTER — RESULT REVIEW (OUTPATIENT)
Age: 78
End: 2023-09-18

## 2023-09-18 DIAGNOSIS — R06.02 SHORTNESS OF BREATH: ICD-10-CM

## 2023-09-18 PROCEDURE — A9500: CPT

## 2023-09-18 PROCEDURE — 78452 HT MUSCLE IMAGE SPECT MULT: CPT | Mod: 26,MH

## 2023-09-18 PROCEDURE — 93018 CV STRESS TEST I&R ONLY: CPT

## 2023-09-18 PROCEDURE — 78452 HT MUSCLE IMAGE SPECT MULT: CPT

## 2023-09-19 DIAGNOSIS — R06.02 SHORTNESS OF BREATH: ICD-10-CM

## 2023-09-21 RX ORDER — ATORVASTATIN CALCIUM 10 MG/1
10 TABLET, FILM COATED ORAL
Qty: 7 | Refills: 0 | Status: ACTIVE | COMMUNITY
Start: 2023-09-21 | End: 1900-01-01

## 2023-09-21 RX ORDER — ROSUVASTATIN CALCIUM 10 MG/1
10 TABLET, FILM COATED ORAL
Qty: 90 | Refills: 3 | Status: DISCONTINUED | COMMUNITY
Start: 2023-09-13 | End: 2023-09-21

## 2023-11-09 ENCOUNTER — APPOINTMENT (OUTPATIENT)
Dept: HEMATOLOGY ONCOLOGY | Facility: CLINIC | Age: 78
End: 2023-11-09
Payer: MEDICARE

## 2023-11-09 ENCOUNTER — LABORATORY RESULT (OUTPATIENT)
Age: 78
End: 2023-11-09

## 2023-11-09 ENCOUNTER — OUTPATIENT (OUTPATIENT)
Dept: OUTPATIENT SERVICES | Facility: HOSPITAL | Age: 78
LOS: 1 days | End: 2023-11-09
Payer: MEDICARE

## 2023-11-09 VITALS
OXYGEN SATURATION: 99 % | RESPIRATION RATE: 18 BRPM | HEIGHT: 62 IN | DIASTOLIC BLOOD PRESSURE: 64 MMHG | TEMPERATURE: 98.6 F | HEART RATE: 73 BPM | SYSTOLIC BLOOD PRESSURE: 142 MMHG

## 2023-11-09 DIAGNOSIS — D50.9 IRON DEFICIENCY ANEMIA, UNSPECIFIED: ICD-10-CM

## 2023-11-09 LAB
ALBUMIN SERPL ELPH-MCNC: 4 G/DL
ALP BLD-CCNC: 93 U/L
ALT SERPL-CCNC: 15 U/L
ANION GAP SERPL CALC-SCNC: 10 MMOL/L
AST SERPL-CCNC: 17 U/L
BILIRUB DIRECT SERPL-MCNC: <0.2 MG/DL
BILIRUB INDIRECT SERPL-MCNC: >0 MG/DL
BILIRUB SERPL-MCNC: 0.2 MG/DL
BUN SERPL-MCNC: 16 MG/DL
CALCIUM SERPL-MCNC: 9.8 MG/DL
CHLORIDE SERPL-SCNC: 107 MMOL/L
CO2 SERPL-SCNC: 27 MMOL/L
CREAT SERPL-MCNC: 0.8 MG/DL
EGFR: 76 ML/MIN/1.73M2
GLUCOSE SERPL-MCNC: 109 MG/DL
HCT VFR BLD CALC: 42 %
HGB BLD-MCNC: 14 G/DL
IRON SATN MFR SERPL: 44 %
IRON SERPL-MCNC: 124 UG/DL
MCHC RBC-ENTMCNC: 31.7 PG
MCHC RBC-ENTMCNC: 33.3 G/DL
MCV RBC AUTO: 95 FL
PLATELET # BLD AUTO: 171 K/UL
PMV BLD: 10.3 FL
POTASSIUM SERPL-SCNC: 4.6 MMOL/L
PROT SERPL-MCNC: 7.1 G/DL
RBC # BLD: 4.42 M/UL
RBC # FLD: 12 %
SODIUM SERPL-SCNC: 144 MMOL/L
TIBC SERPL-MCNC: 280 UG/DL
UIBC SERPL-MCNC: 156 UG/DL
WBC # FLD AUTO: 4.34 K/UL

## 2023-11-09 PROCEDURE — 83540 ASSAY OF IRON: CPT

## 2023-11-09 PROCEDURE — 85027 COMPLETE CBC AUTOMATED: CPT

## 2023-11-09 PROCEDURE — 99213 OFFICE O/P EST LOW 20 MIN: CPT

## 2023-11-09 PROCEDURE — 82728 ASSAY OF FERRITIN: CPT

## 2023-11-09 PROCEDURE — 80076 HEPATIC FUNCTION PANEL: CPT

## 2023-11-09 PROCEDURE — 80048 BASIC METABOLIC PNL TOTAL CA: CPT

## 2023-11-09 PROCEDURE — 83550 IRON BINDING TEST: CPT

## 2023-11-10 DIAGNOSIS — D50.9 IRON DEFICIENCY ANEMIA, UNSPECIFIED: ICD-10-CM

## 2023-11-10 LAB — FERRITIN SERPL-MCNC: 56 NG/ML

## 2024-01-10 ENCOUNTER — APPOINTMENT (OUTPATIENT)
Dept: CARDIOLOGY | Facility: CLINIC | Age: 79
End: 2024-01-10

## 2024-05-08 ENCOUNTER — APPOINTMENT (OUTPATIENT)
Age: 79
End: 2024-05-08
Payer: MEDICARE

## 2024-05-08 ENCOUNTER — LABORATORY RESULT (OUTPATIENT)
Age: 79
End: 2024-05-08

## 2024-05-08 VITALS — BODY MASS INDEX: 28.16 KG/M2 | HEIGHT: 62 IN | WEIGHT: 153 LBS

## 2024-05-08 DIAGNOSIS — D72.819 DECREASED WHITE BLOOD CELL COUNT, UNSPECIFIED: ICD-10-CM

## 2024-05-08 DIAGNOSIS — D50.9 IRON DEFICIENCY ANEMIA, UNSPECIFIED: ICD-10-CM

## 2024-05-08 LAB
ALBUMIN SERPL ELPH-MCNC: 4.3 G/DL
ALP BLD-CCNC: 101 U/L
ALT SERPL-CCNC: 13 U/L
ANION GAP SERPL CALC-SCNC: 10 MMOL/L
AST SERPL-CCNC: 19 U/L
BILIRUB DIRECT SERPL-MCNC: <0.2 MG/DL
BILIRUB INDIRECT SERPL-MCNC: NORMAL MG/DL
BILIRUB SERPL-MCNC: <0.2 MG/DL
BUN SERPL-MCNC: 20 MG/DL
CALCIUM SERPL-MCNC: 9.5 MG/DL
CHLORIDE SERPL-SCNC: 105 MMOL/L
CO2 SERPL-SCNC: 25 MMOL/L
CREAT SERPL-MCNC: 0.8 MG/DL
EGFR: 76 ML/MIN/1.73M2
GLUCOSE SERPL-MCNC: 100 MG/DL
HCT VFR BLD CALC: 40.3 %
HGB BLD-MCNC: 13.4 G/DL
IRON SATN MFR SERPL: 43 %
IRON SERPL-MCNC: 116 UG/DL
MCHC RBC-ENTMCNC: 31.8 PG
MCHC RBC-ENTMCNC: 33.3 G/DL
MCV RBC AUTO: 95.7 FL
PLATELET # BLD AUTO: 145 K/UL
PMV BLD: 10.7 FL
POTASSIUM SERPL-SCNC: 4.7 MMOL/L
PROT SERPL-MCNC: 6.9 G/DL
RBC # BLD: 4.21 M/UL
RBC # FLD: 12 %
SODIUM SERPL-SCNC: 140 MMOL/L
TIBC SERPL-MCNC: 267 UG/DL
UIBC SERPL-MCNC: 151 UG/DL
WBC # FLD AUTO: 4.27 K/UL

## 2024-05-08 PROCEDURE — 99213 OFFICE O/P EST LOW 20 MIN: CPT

## 2024-05-08 NOTE — HISTORY OF PRESENT ILLNESS
[de-identified] : 73 year old white female here referred by Dr. Pérez for iron deficiency anemia.  She had blood work on 926/18. CBC showed Hb 10.8, MCV 84.4, WBC 4.6, . Serum iron 28, % sat 7%, Ferritin 8, B12 335. Her renal function is normal. She admits to change in appetite since death of mother 2 years ago. She does not eat meat. She denies any sign of bleeding, n/v/d. She feels little anxious and dizziness. \par  She had colonoscopy on 9/4/18 by Dr. Salazar. She was told that there was no abnormal finding. \par  Endoscopy is scheduled to be done on 10/4/18.\par  She is taking Aciphex x 15 years. She is concerning if this would cause iron deficiency.\par  \par  She has h/o seizure and has been taking phenobarbital. She had hysterectomy at 41 yo for menorrhagia.\par  \par  She does screening mammogram annually.\par   [de-identified] : 3/22/19:  Pt returns for a follow-up visit. Due to iron deficiency, she received 5 doses of IV venofer 200 mg x 5 doses in Oct-Nov 2018. Her ferritin was 150 in Feb 2019 and percent sat 42%. Today's Hb is 13.8. No fresh complaints. No bleeding. S/P EGD. She was told by Dr Salazar that it was wnl. Capsule endoscopy was not done, given her stable Hb and iron stores.   6/3/19: The patient is here for followup visit. She has a h/o iron deficiency anemia and received  IV Venofer 200 mg x 5 doses in Oct-Nov 2018. She responded well. Her repeat CBC showed normal Hb. Celiac disease serology was negative. B12 and FA wnl. She does not have new complaints.  9/9/19 The patient is here for followup visit. She has a h/o iron deficiency anemia and received  IV Venofer 200 mg x 5 doses in Oct-Nov 2018. She responded well. Her repeat CBC showed normal Hgb=14.2, Dzpdxieb=267. She is eating roast beef sandwiches daily. Celiac disease serology was negative. B12 and FA wnl.  Her sister passed away last month from colon cancer.  She c/o toothache but has appt with dentist tomorrow.  3/9/2020 Patient is here today for follow up visit for h/o iron deficiency anemia.  She last received Venofer 200 mg IV x 5 doses in Oct-Nov 2018 which she responded well.  She feels tired but also busy doing many things...gardening, crafts, baking. CBC today shows wbc=4.46, Hgb=13.5, lrq=469. In the past, celiac disease serology was negative. B12 and FA wnl.  She has appt with PCP, Dr. Pérez today for follow up care and refill of Phenobarbital for h/o grand mal seizures.   9/14/2020: DELBERT HASSAN is a 75 year old female here today for follow up visit for history of GINA. She last received Venofer 200 mg IV x 5 doses in Oct-Nov 2018 with favorable response. She states that she has increased fatigue in the afternoon. She denies any hematochezia, SOB, or palpitations. She attributes the increase fatigue secondary to taking phenobarbital for grand mal seizures. Celiac disease serology was negative. B12 and FA were normal. In addition, she has noticed some swelling and tenderness to the left hand and index finger.   03/22/2021 DELBERT HASSAN is a 75 year old female here today for follow up visit for history of GINA.  She states that she has increased fatigue in the afternoon. She denies any hematochezia, SOB, or palpitations. She reports chronic left hand swelling and pain, she knits frequently.  In her previous visit, 9/2020, she reported left hand swelling and pain an Xray of the hand was completed which demonstrated Diffuse osteopenia without evidence of acute displaced fracture. Moderate to severe degenerative changes, consider CPPD. Second and third MCP joint effusion. Nonspecific soft tissue swelling of the second through fifth phalanges. Had mammogram done this morning.   09/27/2021 DELBERT HASSAN is a 75 year old female here today for follow up visit for history of GINA. In her previous visit, 9/2020, she reported left hand swelling and pain. an Xray of the hand was completed which demonstrated Diffuse osteopenia without evidence of acute displaced fracture. Moderate to severe degenerative changes, consider CPPD. Second and third MCP joint effusion. Nonspecific soft tissue swelling of the second through fifth phalanges. She is doing well today, no complaints regarding melena or hematochezia.  Her hemoglobin today is 14.2 g/dL. Her main concern today is that her  was found to have a colon mass on virtual colonoscopy.  3/28/22 DELBERT HASSAN is a 76 year old female here today for follow up visit for history of GINA. In her previous visit, 9/2020, she reported left hand swelling and pain. an Xray of the hand was completed which demonstrated Diffuse osteopenia without evidence of acute displaced fracture. Moderate to severe degenerative changes, consider CPPD. Second and third MCP joint effusion. Nonspecific soft tissue swelling of the second through fifth phalanges. She is doing well today, no complaints regarding melena or hematochezia.  Her hemoglobin today is 14.0 g/dL. She feels well and denies any complaints. She is nervous in regards to her 's upcoming surgery on Friday.   9/28/22: DELBERT is here today for follow up visit for history of GINA. Repeat CBC in 3/3033 showed normal Hb at 14 and normal serum Fe, % saturation and ferritin. She is feeling well and does not have new complains.  5/8/23: DELBERT is here today for follow up visit for history of GINA.  She is feeling well and does not have new complains. Repeat CBC today shows normal Hb. Iron study showed normal serum Fe, % saturation and ferritin.  11/9/23 DELBERT is here today for follow up visit for history of GINA and chronic leukopenia. She is feeling well. SHe did injure her right shoulder over the summer cutting down a tree, she saw a dr in [place] an XRAY was done which was negative, she received an injection however it didn't help.  Repeat CBC today shows normal Hb 14, WBC 4.34, she is on phenobarbital. Iron study from May showed normal serum Fe, % saturation and ferritin.   5//8/24 DELBERT is here today for follow up visit for history of GINA and chronic leukopenia. She had excision of a basal cell cancer on her right shoulder last month. She follows up with Dr Tobias. She feels well, denies fatigue, shortness of breath or bleeding. She had blood work done @ [facility] on 4/1/24: Hgb 13, Ferritin 22, %sat 36 and serum Iron 115.

## 2024-05-08 NOTE — REVIEW OF SYSTEMS
[Joint Pain] : joint pain [Joint Stiffness] : joint stiffness [Suicidal] : not suicidal [Insomnia] : no insomnia [Depression] : no depression [Negative] : Psychiatric

## 2024-05-08 NOTE — ASSESSMENT
[FreeTextEntry1] : Iron Deficiency Anemia. Chronic leukopenia.  PLAN: -- Blood work done on 4/1/24: Hgb 13, Ferritin 22, %sat 36 and serum Iron 115. -- Will order blood work: CBC, BMP, LFT, Ferritin and TIBC. CBC today showed normal Hgb 13.4 and mild leucopenia is stable. -- Continue observation. -- Basal cell cancer. Follow up with Dr Tobias as recommended. -- Follow up with PCP for health maintenance.  -- RTO for followup in 6 months

## 2024-05-09 LAB — FERRITIN SERPL-MCNC: 43 NG/ML

## 2024-05-17 ENCOUNTER — OUTPATIENT (OUTPATIENT)
Dept: OUTPATIENT SERVICES | Facility: HOSPITAL | Age: 79
LOS: 1 days | End: 2024-05-17
Payer: MEDICARE

## 2024-05-17 DIAGNOSIS — Z12.31 ENCOUNTER FOR SCREENING MAMMOGRAM FOR MALIGNANT NEOPLASM OF BREAST: ICD-10-CM

## 2024-05-17 PROCEDURE — 77063 BREAST TOMOSYNTHESIS BI: CPT | Mod: 26

## 2024-05-17 PROCEDURE — 77063 BREAST TOMOSYNTHESIS BI: CPT

## 2024-05-17 PROCEDURE — 77067 SCR MAMMO BI INCL CAD: CPT

## 2024-05-17 PROCEDURE — 77067 SCR MAMMO BI INCL CAD: CPT | Mod: 26

## 2024-05-18 DIAGNOSIS — Z12.31 ENCOUNTER FOR SCREENING MAMMOGRAM FOR MALIGNANT NEOPLASM OF BREAST: ICD-10-CM

## 2024-11-11 ENCOUNTER — LABORATORY RESULT (OUTPATIENT)
Age: 79
End: 2024-11-11

## 2024-11-11 ENCOUNTER — OUTPATIENT (OUTPATIENT)
Dept: OUTPATIENT SERVICES | Facility: HOSPITAL | Age: 79
LOS: 1 days | End: 2024-11-11
Payer: MEDICARE

## 2024-11-11 ENCOUNTER — APPOINTMENT (OUTPATIENT)
Age: 79
End: 2024-11-11
Payer: MEDICARE

## 2024-11-11 VITALS
SYSTOLIC BLOOD PRESSURE: 147 MMHG | DIASTOLIC BLOOD PRESSURE: 64 MMHG | WEIGHT: 150.44 LBS | HEIGHT: 65 IN | BODY MASS INDEX: 25.07 KG/M2 | HEART RATE: 61 BPM | TEMPERATURE: 98.4 F | OXYGEN SATURATION: 97 %

## 2024-11-11 DIAGNOSIS — D50.9 IRON DEFICIENCY ANEMIA, UNSPECIFIED: ICD-10-CM

## 2024-11-11 LAB
ANION GAP SERPL CALC-SCNC: 9 MMOL/L
BUN SERPL-MCNC: 18 MG/DL
CALCIUM SERPL-MCNC: 9.2 MG/DL
CHLORIDE SERPL-SCNC: 104 MMOL/L
CO2 SERPL-SCNC: 28 MMOL/L
CREAT SERPL-MCNC: 0.8 MG/DL
EGFR: 76 ML/MIN/1.73M2
GLUCOSE SERPL-MCNC: 99 MG/DL
HCT VFR BLD CALC: 38.7 %
HGB BLD-MCNC: 12.8 G/DL
IRON SATN MFR SERPL: 48 %
IRON SERPL-MCNC: 132 UG/DL
MCHC RBC-ENTMCNC: 32.2 PG
MCHC RBC-ENTMCNC: 33.1 G/DL
MCV RBC AUTO: 97.5 FL
PLATELET # BLD AUTO: 153 K/UL
PMV BLD: 10.1 FL
POTASSIUM SERPL-SCNC: 4.7 MMOL/L
RBC # BLD: 3.97 M/UL
RBC # FLD: 11.9 %
SODIUM SERPL-SCNC: 141 MMOL/L
TIBC SERPL-MCNC: 273 UG/DL
UIBC SERPL-MCNC: 141 UG/DL
WBC # FLD AUTO: 5.06 K/UL

## 2024-11-11 PROCEDURE — 83540 ASSAY OF IRON: CPT

## 2024-11-11 PROCEDURE — 99213 OFFICE O/P EST LOW 20 MIN: CPT

## 2024-11-11 PROCEDURE — 82728 ASSAY OF FERRITIN: CPT

## 2024-11-11 PROCEDURE — 80048 BASIC METABOLIC PNL TOTAL CA: CPT

## 2024-11-11 PROCEDURE — 83550 IRON BINDING TEST: CPT

## 2024-11-11 PROCEDURE — 85027 COMPLETE CBC AUTOMATED: CPT

## 2024-11-12 LAB — FERRITIN SERPL-MCNC: 46 NG/ML
